# Patient Record
Sex: FEMALE | Race: ASIAN | URBAN - METROPOLITAN AREA
[De-identification: names, ages, dates, MRNs, and addresses within clinical notes are randomized per-mention and may not be internally consistent; named-entity substitution may affect disease eponyms.]

---

## 2017-01-06 ENCOUNTER — TRANSFERRED RECORDS (OUTPATIENT)
Dept: HEALTH INFORMATION MANAGEMENT | Facility: CLINIC | Age: 72
End: 2017-01-06

## 2017-01-26 ENCOUNTER — TRANSFERRED RECORDS (OUTPATIENT)
Dept: HEALTH INFORMATION MANAGEMENT | Facility: CLINIC | Age: 72
End: 2017-01-26

## 2017-04-05 DIAGNOSIS — R42 VERTIGO: Primary | ICD-10-CM

## 2017-04-06 ENCOUNTER — OFFICE VISIT (OUTPATIENT)
Dept: AUDIOLOGY | Facility: CLINIC | Age: 72
End: 2017-04-06

## 2017-04-06 ENCOUNTER — OFFICE VISIT (OUTPATIENT)
Dept: OTOLARYNGOLOGY | Facility: CLINIC | Age: 72
End: 2017-04-06

## 2017-04-06 DIAGNOSIS — H90.3 SNHL (SENSORY-NEURAL HEARING LOSS), ASYMMETRICAL: ICD-10-CM

## 2017-04-06 DIAGNOSIS — R42 DIZZINESS: Primary | ICD-10-CM

## 2017-04-06 DIAGNOSIS — H92.01 PAIN IN RIGHT EAR: ICD-10-CM

## 2017-04-06 DIAGNOSIS — H93.11 TINNITUS, RIGHT: ICD-10-CM

## 2017-04-06 DIAGNOSIS — H93.8X1 SENSATION OF FULLNESS IN RIGHT EAR: ICD-10-CM

## 2017-04-06 DIAGNOSIS — H90.3 SENSORINEURAL HEARING LOSS, ASYMMETRICAL: ICD-10-CM

## 2017-04-06 RX ORDER — MULTIPLE VITAMINS W/ MINERALS TAB 9MG-400MCG
1 TAB ORAL DAILY
COMMUNITY

## 2017-04-06 ASSESSMENT — PAIN SCALES - GENERAL: PAINLEVEL: EXTREME PAIN (8)

## 2017-04-06 NOTE — PATIENT INSTRUCTIONS
The patient presents with a history of dizziness and asymmetric sensorineural hearing loss.  The patient and I have discussed proceeding with further evaluation of the symptoms.  The patient will be referred for vestibular evaluation. She will obtain a CD copy of her MRI scan of the head and she will bring this to her next visit for our review. She will be seen again after her vestibular testing is completed.

## 2017-04-06 NOTE — LETTER
4/6/2017       RE: Karolina Winn  [address] [facility] [address] [place] [address]     Dear Colleague,    Thank you for referring your patient, Karolina Winn, to the [facility] EAR NOSE AND THROAT at [facility]. Please see a copy of my visit note below.    The patient presents with a history of dizziness.  The patient reports that the symptoms have recently become much more severe, although the patient has had occasional difficulties with dizziness episodes in the past. She reports that this difficulty began a few years ago. She notices worsening hearing in the right ear more than the left ear.  The patient has had associated nausea with the events. The patient has had no weakness in the extremities or facial weakness or slurring of speech. The patient denies sinusitis, rhinitis, facial pain, nasal obstruction or purulent nasal discharge. The patient denies chronic or recurrent tonsillitis, chronic or recurrent pharyngitis. The patient's Audiogram and Tympanogram demonstrates bilateral asymmetric sensorineural hearing loss worse in the right ear than in the left ear. The patient has 60% word recognition in the right ear and 100% in the left ear. The patient's tympanograms are normal. She reports that she had an MRI scan of the head and that this was normal. However, we do not have the scan or the report.     This patient is seen in consultation at the request of Dr. Shaw in [place], [place].    All other systems were reviewed and they are either negative or they are not directly pertinent to this Otolaryngology examination.      Past Medical History:    No past medical history on file.    Past Surgical History:    No past surgical history on file.    Medications:      Current Outpatient Prescriptions:      TRIAMTERENE PO, Take 37.5 mg by mouth, Disp: , Rfl:      multivitamin, therapeutic with minerals (MULTI-VITAMIN) TABS tablet, Take 1 tablet by mouth daily, Disp: , Rfl:      Allergies:    Penicillins    Physical Examination:    The patient is a well developed, well nourished female in no apparent distress.  She is normocephalic, atraumatic with pupils equally round and reactive to light.    Oral Cavity Examination:  Normal mucosa with no masses or lesions  Nasal Examination: Normal mucosa with no masses or lesions  Ear Examination: Ear canals clear, tympanic membranes and middle ear spaces normal  Neurological Examination: Facial nerve function intact and symmetric.  The patient has no gaze induced or spontaneous nystagmus.   Integumentary Examination: No lesions on the skin of the head and neck  Neck Examination: No masses or lesions, no lymphadenopathy  Endocrine Examination: Normal thyroid examination    Assessment and Plan:    The patient presents with a history of dizziness and asymmetric sensorineural hearing loss.  The patient and I have discussed proceeding with further evaluation of the symptoms.  The patient will be referred for vestibular evaluation. She will obtain a CD copy of her MRI scan of the head and she will bring this to her next visit for our review. She will be seen again after her vestibular testing is completed.     CC: Dr. Shaw in [place], [place]    Again, thank you for allowing me to participate in the care of your patient.      Sincerely,    Christoph De MD

## 2017-04-06 NOTE — MR AVS SNAPSHOT
After Visit Summary   4/6/2017    Karolina Winn    MRN: [id number]           Patient Information     Date Of Birth          1945        Visit Information        Provider Department      4/6/2017 2:30 PM Christoph De MD [facility] Ear Nose and Throat        Today's Diagnoses     Dizziness    -  1    SNHL (sensory-neural hearing loss), asymmetrical          Care Instructions    The patient presents with a history of dizziness and asymmetric sensorineural hearing loss.  The patient and I have discussed proceeding with further evaluation of the symptoms.  The patient will be referred for vestibular evaluation. She will obtain a CD copy of her MRI scan of the head and she will bring this to her next visit for our review. She will be seen again after her vestibular testing is completed.         Follow-ups after your visit        Additional Services     AUDIOLOGY BALANCE REFERRAL       Please perform: VNG, Caloric testing and ECOG                  Your next 10 appointments already scheduled     May 31, 2017  1:00 PM CDT   Electrocochleography with Dinora Briceno,  AUD ABR MACHINE [address] [place] [address] Audiology ([facility] [place])    [address] [place] [id number] [phone]           ECO: Electrocochleography  How is the test done?  The test records electrical signals made by the inner ear and auditory nerve.   First, we clean the skin before placing sticky patches (electrodes) in several places on the head and shoulders.   One more electrode is placed in the ear canal. This is a small piece of cotton, soaked in water, connected by a tiny wire.   The examiner looks through a microscope to place the electrode on the eardrum. Most patients feel a tickle or slight pressure when this is done. A few patients feel mild discomfort.   After the cotton is in place, most patients do not feel it at all. A foam plug placed in the ear canal holds the electrode  in place.   The plug also serves as an earphone for sounds presented as part of the test. The patient hears very rapid clicks that are loud but usually not uncomfortable.   The patient lies on a bed and should try to relax. Many people sleep through the test.  ABR (auditory brainstem response) test The ABR test is usually done at the same time as ECOG. This test records electrical signals made by the brain when it hears sounds. The electrodes can also record brain signals so that both tests can be done at the same time. The two tests together can help find out where your symptoms come from. The two tests usually take about 2 hours. It takes another hour to read the results and write a report. The report is sent to the referring doctor who will choose a treatment.             Jun 01, 2017  1:00 PM CDT   (Arrive by 12:45 PM)   Balance Testing with Kae Pollock [facility] Audiology ([facility])    [address] [place] [address] [phone]           You are scheduled for the following tests: VNG (Videonystagmography). You will wear goggles with small cameras. These record small eye movements as you change position. This test takes 1 to 1 1/2 hours. Rotational Chair. You will sit a chair that has a motor. The room will be dark. You will wear goggles with a camera while the chair rocks slowly from side to side. This test takes 20 to 30 minutes. CDP (Computerized Dynamic Posturography). You will stand on a platform with your eyes open or closed. It will be unsteady at times. This will tell us how your balance systems work together and how well you sense the ground under your feet. This test takes 30 minutes.  Why am I having these tests? These are tests for your inner ear. They may help us find out why you feel dizzy or off balance.  How do I prepare? Below is a list of things that can affect test results. Do NOT take these for 48 hours before your tests or we  will need to reschedule. We want to make sure your test results are correct. Ask your doctor if you have questions about stopping any medicine.  48 hours before the tests: Stop drinking alcohol (beer, wine, liquor). Do NOT take Amitriptyline. Do NOT take medicines for: Allergy or colds. Examples: Benadryl (diphenhydramine), Allegra (fexofenadine), Zyrtec (cetirizine) and any over-the-counter medicine that may cause drowsiness. Anti-anxiety, unless you have been on them every day for the past 8 weeks or more. Examples: Xanax (alprazolam), Klonopin (clonazepam), Valium (diazepam), Ativan (lorazepam). Dizziness and nausea. Examples: Antivert/Bonine/Dramamine Less-Drowsy Formula (meclizine), Dramamine (dimenhydrinate), Trans-derm Scop (Scopolamine), Compazine (prochlorperazine), Phenergan (promethazine). Sleeping. This includes sleeping pills, sedatives, tranquilizers, muscle relaxants and narcotics. It is okay to take medicines for diabetes, heart, blood pressure, seizures, thyroid or an ongoing condition.  The day of the tests:   Please have a  with you.   Do not have caffeine (coffee, soda, chocolate, energy drinks).   Do not smoke or have nicotine for at least 4 hours before the tests. This includes tobacco, e-cigarettes and nicotine gum.   Do not eat 2 to 3 hours before the tests, unless you have diabetes. Then, you should follow your usual diet.   Do not wear any make-up or lotions around your eyes or eyelids.   If you wear contact lenses, be prepared to take them out for testing; or wear your glasses.   If you take the anti-nausea medicine Zofran (ondansetron), you may bring it with you to take after your tests.  Who should I call if I have questions? If you have any questions, please call the [facility] at [facility]: [phone]            Jun 01, 2017  2:30 PM CDT   (Arrive by 2:15 PM)   Return Visit with Christoph De MD   [facility] Ear Nose and Throat ([facility])    [address] [facility]  [address] [facility] [address] [phone]              Who to contact     Please call your clinic at [phone] to:    Ask questions about your health    Make or cancel appointments    Discuss your medicines    Learn about your test results    Speak to your doctor   If you have compliments or concerns about an experience at your clinic, or if you wish to file a complaint, please contact [facility] Patient Relations at [phone] or email us at [email or web address]         Additional Information About Your Visit        [employer]is Information     CRE Securet is an electronic gateway that provides easy, online access to your medical records. With Sevcon, you can request a clinic appointment, read your test results, renew a prescription or communicate with your care team.     To sign up for Sevcon visit the website at [email or web address]   You will be asked to enter the access code listed below, as well as some personal information. Please follow the directions to create your username and password.     Your access code is: [email or web address]  Expires: 2017  2:54 PM     Your access code will  in 90 days. If you need help or a new code, please contact your [facility] Clinic or call [phone] for assistance.        Care EveryWhere ID     This is your Care EveryWhere ID. This could be used by other organizations to access your [place] medical records  [id number]         Blood Pressure from Last 3 Encounters:   No data found for BP    Weight from Last 3 Encounters:   No data found for Wt              We Performed the Following     AUDIOLOGY BALANCE REFERRAL        Primary Care Provider    No Ref Primary Verified Flk Only       No address on file        Thank you!     Thank you for choosing [facility] EAR NOSE AND THROAT  for your care. Our goal is always to provide you with excellent  care. Hearing back from our patients is one way we can continue to improve our services. Please take a few minutes to complete the written survey that you may receive in the mail after your visit with us. Thank you!             Your Updated Medication List - Protect others around you: Learn how to safely use, store and throw away your medicines at www.disposemymeds.org.          This list is accurate as of: 4/6/17  3:41 PM.  Always use your most recent med list.                   Brand Name Dispense Instructions for use    Multi-vitamin Tabs tablet      Take 1 tablet by mouth daily       TRIAMTERENE PO      Take 37.5 mg by mouth

## 2017-04-06 NOTE — PROGRESS NOTES
The patient presents with a history of dizziness.  The patient reports that the symptoms have recently become much more severe, although the patient has had occasional difficulties with dizziness episodes in the past. She reports that this difficulty began a few years ago. She notices worsening hearing in the right ear more than the left ear.  The patient has had associated nausea with the events. The patient has had no weakness in the extremities or facial weakness or slurring of speech. The patient denies sinusitis, rhinitis, facial pain, nasal obstruction or purulent nasal discharge. The patient denies chronic or recurrent tonsillitis, chronic or recurrent pharyngitis. The patient's Audiogram and Tympanogram demonstrates bilateral asymmetric sensorineural hearing loss worse in the right ear than in the left ear. The patient has 60% word recognition in the right ear and 100% in the left ear. The patient's tympanograms are normal. She reports that she had an MRI scan of the head and that this was normal. However, we do not have the scan or the report.     This patient is seen in consultation at the request of Dr. Shaw in [place], [place].    All other systems were reviewed and they are either negative or they are not directly pertinent to this Otolaryngology examination.      Past Medical History:    No past medical history on file.    Past Surgical History:    No past surgical history on file.    Medications:      Current Outpatient Prescriptions:      TRIAMTERENE PO, Take 37.5 mg by mouth, Disp: , Rfl:      multivitamin, therapeutic with minerals (MULTI-VITAMIN) TABS tablet, Take 1 tablet by mouth daily, Disp: , Rfl:     Allergies:    Penicillins    Physical Examination:    The patient is a well developed, well nourished female in no apparent distress.  She is normocephalic, atraumatic with pupils equally round and reactive to light.    Oral Cavity Examination:  Normal mucosa with no masses or lesions  Nasal  Examination: Normal mucosa with no masses or lesions  Ear Examination: Ear canals clear, tympanic membranes and middle ear spaces normal  Neurological Examination: Facial nerve function intact and symmetric.  The patient has no gaze induced or spontaneous nystagmus.   Integumentary Examination: No lesions on the skin of the head and neck  Neck Examination: No masses or lesions, no lymphadenopathy  Endocrine Examination: Normal thyroid examination    Assessment and Plan:    The patient presents with a history of dizziness and asymmetric sensorineural hearing loss.  The patient and I have discussed proceeding with further evaluation of the symptoms.  The patient will be referred for vestibular evaluation. She will obtain a CD copy of her MRI scan of the head and she will bring this to her next visit for our review. She will be seen again after her vestibular testing is completed.     CC: Dr. Shaw in [place], [place]

## 2017-04-06 NOTE — MR AVS SNAPSHOT
After Visit Summary   2017    Karolina Winn    MRN: [id number]           Patient Information     Date Of Birth          1945        Visit Information        Provider Department      2017 1:30 PM Molly Low AuD [facility] Audiology        Today's Diagnoses     Dizziness    -  1    Sensorineural hearing loss, asymmetrical        Tinnitus, right        Pain in right ear        Sensation of fullness in right ear           Follow-ups after your visit        Who to contact     Please call your clinic at [phone] to:    Ask questions about your health    Make or cancel appointments    Discuss your medicines    Learn about your test results    Speak to your doctor   If you have compliments or concerns about an experience at your clinic, or if you wish to file a complaint, please contact [facility] Patient Relations at [phone] or email us at [email or web address]         Additional Information About Your Visit        MyChart Information     Ingrian Networkst is an electronic gateway that provides easy, online access to your medical records. With MightyQuiz, you can request a clinic appointment, read your test results, renew a prescription or communicate with your care team.     To sign up for [employer]rian Networkst visit the website at [email or web address]   You will be asked to enter the access code listed below, as well as some personal information. Please follow the directions to create your username and password.     Your access code is: [email or web address]  Expires: 2017  2:54 PM     Your access code will  in 90 days. If you need help or a new code, please contact your [facility] or call [phone] for assistance.        Care EveryWhere ID     This is your Care EveryWhere ID. This could be used by other organizations to access your [place] medical records  [id number]         Blood Pressure from Last 3 Encounters:   No data  found for BP    Weight from Last 3 Encounters:   No data found for Wt              We Performed the Following     AUDIOGRAM/TYMPANOGRAM - INTERFACE     [facility] Rec[facility] ([address])     Tymps / Reflex   ([id number])        Primary Care Provider    No Ref Primary Verified Flk Only       No address on file        Thank you!     Thank you for choosing [facility] AUDIOLOGY  for your care. Our goal is always to provide you with excellent care. Hearing back from our patients is one way we can continue to improve our services. Please take a few minutes to complete the written survey that you may receive in the mail after your visit with us. Thank you!             Your Updated Medication List - Protect others around you: Learn how to safely use, store and throw away your medicines at www.disposemymeds.org.          This list is accurate as of: 4/6/17  2:47 PM.  Always use your most recent med list.                   Brand Name Dispense Instructions for use    Multi-vitamin Tabs tablet      Take 1 tablet by mouth daily       TRIAMTERENE PO      Take 37.5 mg by mouth

## 2017-04-06 NOTE — PROGRESS NOTES
AUDIOLOGY REPORT    SUMMARY: Audiology visit completed. See audiogram for results.      RECOMMENDATIONS: Follow-up with ENT.    Dinora Lopez  Audiologist  [place] License  #[id number]

## 2017-05-31 ENCOUNTER — OFFICE VISIT (OUTPATIENT)
Dept: AUDIOLOGY | Facility: CLINIC | Age: 72
End: 2017-05-31

## 2017-05-31 DIAGNOSIS — H90.5 SENSORINEURAL HEARING LOSS: Primary | ICD-10-CM

## 2017-05-31 NOTE — MR AVS SNAPSHOT
After Visit Summary   5/31/2017    Karolina Winn    MRN: [id number]           Patient Information     Date Of Birth          1945        Visit Information        Provider Department      5/31/2017 2:30 PM Liza Tolbert AuD; JAS ALVARADO ABR MACHINE 1 [facility]         Follow-ups after your visit        Your next 10 appointments already scheduled     May 31, 2017  2:30 PM CDT   Electrocochleography with Dinora Briceno, JAS ALVARADO ABR MACHINE 1   [facility] ([facility])    [address] [place] [id number] [phone]           ECO: Electrocochleography  How is the test done?  The test records electrical signals made by the inner ear and auditory nerve.   First, we clean the skin before placing sticky patches (electrodes) in several places on the head and shoulders.   One more electrode is placed in the ear canal. This is a small piece of cotton, soaked in water, connected by a tiny wire.   The examiner looks through a microscope to place the electrode on the eardrum. Most patients feel a tickle or slight pressure when this is done. A few patients feel mild discomfort.   After the cotton is in place, most patients do not feel it at all. A foam plug placed in the ear canal holds the electrode in place.   The plug also serves as an earphone for sounds presented as part of the test. The patient hears very rapid clicks that are loud but usually not uncomfortable.   The patient lies on a bed and should try to relax. Many people sleep through the test.  ABR (auditory brainstem response) test The ABR test is usually done at the same time as ECOG. This test records electrical signals made by the brain when it hears sounds. The electrodes can also record brain signals so that both tests can be done at the same time. The two tests together can help find out where your symptoms come from. The two tests usually take about 2 hours. It takes  another hour to read the results and write a report. The report is sent to the referring doctor who will choose a treatment.             Jun 01, 2017  1:00 PM CDT   (Arrive by 12:45 PM)   Balance Testing with Kae Pollock [facility] ([facility] [place])    [address] [address] [phone]           You are scheduled for the following tests: VNG (Videonystagmography). You will wear goggles with small cameras. These record small eye movements as you change position. This test takes 1 to 1 1/2 hours. Rotational Chair. You will sit a chair that has a motor. The room will be dark. You will wear goggles with a camera while the chair rocks slowly from side to side. This test takes 20 to 30 minutes. CDP (Computerized Dynamic Posturography). You will stand on a platform with your eyes open or closed. It will be unsteady at times. This will tell us how your balance systems work together and how well you sense the ground under your feet. This test takes 30 minutes.  Why am I having these tests? These are tests for your inner ear. They may help us find out why you feel dizzy or off balance.  How do I prepare? Below is a list of things that can affect test results. Do NOT take these for 48 hours before your tests or we will need to reschedule. We want to make sure your test results are correct. Ask your doctor if you have questions about stopping any medicine.  48 hours before the tests: Stop drinking alcohol (beer, wine, liquor). Do NOT take Amitriptyline. Do NOT take medicines for: Allergy or colds. Examples: Benadryl (diphenhydramine), Allegra (fexofenadine), Zyrtec (cetirizine) and any over-the-counter medicine that may cause drowsiness. Anti-anxiety, unless you have been on them every day for the past 8 weeks or more. Examples: Xanax (alprazolam), Klonopin (clonazepam), Valium (diazepam), Ativan (lorazepam). Dizziness and nausea. Examples:  Antivert/Bonine/Dramamine Less-Drowsy Formula (meclizine), Dramamine (dimenhydrinate), Trans-derm Scop (Scopolamine), Compazine (prochlorperazine), Phenergan (promethazine). Sleeping. This includes sleeping pills, sedatives, tranquilizers, muscle relaxants and narcotics. It is okay to take medicines for diabetes, heart, blood pressure, seizures, thyroid or an ongoing condition.  The day of the tests:   Please have a  with you.   Do not have caffeine (coffee, soda, chocolate, energy drinks).   Do not smoke or have nicotine for at least 4 hours before the tests. This includes tobacco, e-cigarettes and nicotine gum.   Do not eat 2 to 3 hours before the tests, unless you have diabetes. Then, you should follow your usual diet.   Do not wear any make-up or lotions around your eyes or eyelids.   If you wear contact lenses, be prepared to take them out for testing; or wear your glasses.   If you take the anti-nausea medicine Zofran (ondansetron), you may bring it with you to take after your tests.  Who should I call if I have questions? If you have any questions, please call the [facility] at [facility]: [phone]            Jun 01, 2017  2:30 PM CDT   (Arrive by 2:15 PM)   Return Visit with Christoph De MD   [facility] ([facility])    [address] [place] [id number] [phone]              Who to contact     Please call your clinic at [phone] to:    Ask questions about your health    Make or cancel appointments    Discuss your medicines    Learn about your test results    Speak to your doctor   If you have compliments or concerns about an experience at your clinic, or if you wish to file a complaint, please contact [facility] Patient Relations at [phone] or email us at [email or web address]         Additional Information About Your Visit        Kamlesh  Information     [employer] is an electronic gateway that provides easy, online access to your medical records. With [employer], you can request a clinic appointment, read your test results, renew a prescription or communicate with your care team.     To sign up for [employer] visit the website at [email or web address]   You will be asked to enter the access code listed below, as well as some personal information. Please follow the directions to create your username and password.     Your access code is: [email or web address]  Expires: 8/15/2017  6:30 AM     Your access code will  in 90 days. If you need help or a new code, please contact your [facility] or call [phone] for assistance.        Care EveryWhere ID     This is your Care EveryWhere ID. This could be used by other organizations to access your [place] medical records  [id number]         Blood Pressure from Last 3 Encounters:   No data found for BP    Weight from Last 3 Encounters:   No data found for Wt              Today, you had the following     No orders found for display       Primary Care Provider    No Ref Primary Verified Flk Only       No address on file        Thank you!     Thank you for choosing [facility]  for your care. Our goal is always to provide you with excellent care. Hearing back from our patients is one way we can continue to improve our services. Please take a few minutes to complete the written survey that you may receive in the mail after your visit with us. Thank you!             Your Updated Medication List - Protect others around you: Learn how to safely use, store and throw away your medicines at www.disposemymeds.org.          This list is accurate as of: 17  2:28 PM.  Always use your most recent med list.                   Brand Name Dispense Instructions for use    Multi-vitamin Tabs tablet      Take 1 tablet by mouth daily       TRIAMTERENE PO      Take 37.5 mg by mouth

## 2017-05-31 NOTE — PROGRESS NOTES
AUDIOLOGY REPORT    BACKGROUND INFORMATION: Karolina Winn was seen in Audiology at the [facility] on 5/31/2017 for an electrocochleography (ECochG) evaluation, referred by Christoph De M.D. The patient reports a 5-6 year history of episodic vertigo, right sided tinnitus, and decreased right hearing.  The patient reports that she used to get vertigo episodes once every 2-3 months; however they have recently increased to weekly episodes. Her episodes generally last several hours and are consistently associated with nausea and vomiting. Karolina reports that closing her eyes and laying down can help improve her vertigo. Bending her head back and not getting enough sleep can bring on her symptoms. Karolina reports that her cousin also has as history of dizziness, and underwent surgery by a ENT here at the [facility] many years ago. Her cousin has not had a dizzy episode since her surgery. Karolina is interested in learning about surgical options to aid in her dizziness.    Most recent hearing evaluation performed revealed left normal to moderate sensorineural hearing loss, and right moderately-severe rising to mild sloping to severe sensorineural hearing loss.      TEST RESULTS AND PROCEDURES: Using a microscope tympanic membranes were visualized.  Tympanograms showed normal eardrum mobility bilaterally.   A two-channel ECochG recording was performed for clicks, 1000 Hz, and 2000 Hz tonebursts bilaterally.  Clicks for the right ear showed normal SP/AP ratios.  Tonebursts in the right ear for 1000 Hz and 2000 Hz showed normal summating potentials (-.07 uV for 1000 Hz and -.5 uV for 2000 Hz).  Clicks for the left ear showed normal SP/AP ratios.  Tonebursts in the left ear for 1000 Hz and 2000 Hz showed normal summating potentials (-.42 uV for 1000 Hz and -.42 uV for 2000 Hz).  Abrnormal SP/AP ratios must be greater than .43 for clicks.  Abnormal summating potentials must be more  negative than 1.75 uV at 1000 Hz and 2.25 uV at 2000 Hz.      Click SP/AP ratio 1000 Hz toneburst summating potential 2000 Hz toneburst summating potential   Right ear  .801  -.07 uV  -.5 uV   Left ear  .532  -.42 uV  -.42 uV       SUMMARY AND RECOMMENDATIONS: Today s ECochG revealed abnormal SP/AP ratios and normal summating potentials, bilaterally. Abnormally high SP/AP ratios may be indicative of endolymphatic hydrops or superior canal dehiscence.  Call this clinic with questions regarding today s results.  Follow-up with Christoph De M.D.  for medical management.      Marshall Parks.  Licensed Audiologist  [place] #[id number]

## 2017-06-01 ENCOUNTER — OFFICE VISIT (OUTPATIENT)
Dept: OTOLARYNGOLOGY | Facility: CLINIC | Age: 72
End: 2017-06-01

## 2017-06-01 ENCOUNTER — OFFICE VISIT (OUTPATIENT)
Dept: AUDIOLOGY | Facility: CLINIC | Age: 72
End: 2017-06-01

## 2017-06-01 VITALS — HEIGHT: 58 IN | BODY MASS INDEX: 28.76 KG/M2 | WEIGHT: 137 LBS

## 2017-06-01 DIAGNOSIS — R42 DIZZINESS: Primary | ICD-10-CM

## 2017-06-01 DIAGNOSIS — R42 DIZZINESS AND GIDDINESS: Primary | ICD-10-CM

## 2017-06-01 DIAGNOSIS — H90.3 SNHL (SENSORY-NEURAL HEARING LOSS), ASYMMETRICAL: ICD-10-CM

## 2017-06-01 DIAGNOSIS — H81.01 MENIERE DISEASE, RIGHT: ICD-10-CM

## 2017-06-01 RX ORDER — TRIAMTERENE AND HYDROCHLOROTHIAZIDE 37.5; 25 MG/1; MG/1
CAPSULE ORAL
Qty: 90 CAPSULE | Refills: 1 | Status: SHIPPED | OUTPATIENT
Start: 2017-06-01 | End: 2017-06-01

## 2017-06-01 RX ORDER — TRIAMTERENE AND HYDROCHLOROTHIAZIDE 37.5; 25 MG/1; MG/1
CAPSULE ORAL
Qty: 90 CAPSULE | Refills: 1 | Status: SHIPPED | OUTPATIENT
Start: 2017-06-01

## 2017-06-01 ASSESSMENT — PAIN SCALES - GENERAL: PAINLEVEL: MODERATE PAIN (5)

## 2017-06-01 NOTE — PROGRESS NOTES
The patient presents with a history of dizziness.  The patient reports that the symptoms have recently become much more severe, although the patient has had occasional difficulties with dizziness episodes in the past. She reports that this difficulty began a few years ago. She notices worsening hearing in the right ear more than the left ear.  The patient has had associated nausea with the events. The patient has had no weakness in the extremities or facial weakness or slurring of speech. The patient's Audiogram and Tympanogram demonstrates bilateral asymmetric sensorineural hearing loss worse in the right ear than in the left ear. The patient has 60% word recognition in the right ear and 100% in the left ear. The patient's tympanograms are normal. She reports that she had an MRI scan of the head and that this was normal and this is reviewed today. The patient's vestibular testing is reviewed with her and this demonstrates evidence of meniere's disease with the right ear the worst ear on testing today. This testing is reviewed with the Audiologist who have just completed this testing.       All other systems were reviewed and they are either negative or they are not directly pertinent to this Otolaryngology examination.      Past Medical History:    Past Medical History:   Diagnosis Date     Arthritis      Benign positional vertigo      Conductive hearing loss      Hearing problem      Meniere's disease      Reduced vision      Sensorineural hearing loss      Uncomplicated asthma        Past Surgical History:    No past surgical history on file.    Medications:      Current Outpatient Prescriptions:      TRIAMTERENE PO, Take 37.5 mg by mouth, Disp: , Rfl:      multivitamin, therapeutic with minerals (MULTI-VITAMIN) TABS tablet, Take 1 tablet by mouth daily, Disp: , Rfl:     Allergies:    Penicillins    Physical Examination:    The patient is a well developed, well nourished female in no apparent distress.  She is  normocephalic, atraumatic with pupils equally round and reactive to light.    Oral Cavity Examination:  Normal mucosa with no masses or lesions  Nasal Examination: Normal mucosa with no masses or lesions  Ear Examination: Ear canals clear, tympanic membranes and middle ear spaces normal  Neurological Examination: Facial nerve function intact and symmetric.  The patient has no gaze induced or spontaneous nystagmus.   Integumentary Examination: No lesions on the skin of the head and neck    Assessment and Plan:    The patient presents with a history of dizziness and asymmetric sensorineural hearing loss.  She appears to have meniere's disease by vestibular and hearing and testing and she has no evidence of retrocochlear pathology or intracranial pathology. She will be initiated on dyazide and she will follow up with neurotologist Dr. Rick Nissen for further management.     CC: Dr. Shaw in [place], [place]

## 2017-06-01 NOTE — LETTER
6/1/2017       RE: Karolina Winn  [address] [facility] [place] [address]     Dear Colleague,    Thank you for referring your patient, Karolina Winn, to the [facility] at [facility]. Please see a copy of my visit note below.    The patient presents with a history of dizziness.  The patient reports that the symptoms have recently become much more severe, although the patient has had occasional difficulties with dizziness episodes in the past. She reports that this difficulty began a few years ago. She notices worsening hearing in the right ear more than the left ear.  The patient has had associated nausea with the events. The patient has had no weakness in the extremities or facial weakness or slurring of speech. The patient's Audiogram and Tympanogram demonstrates bilateral asymmetric sensorineural hearing loss worse in the right ear than in the left ear. The patient has 60% word recognition in the right ear and 100% in the left ear. The patient's tympanograms are normal. She reports that she had an MRI scan of the head and that this was normal and this is reviewed today. The patient's vestibular testing is reviewed with her and this demonstrates evidence of meniere's disease with the right ear the worst ear on testing today. This testing is reviewed with the Audiologist who have just completed this testing.       All other systems were reviewed and they are either negative or they are not directly pertinent to this Otolaryngology examination.      Past Medical History:    Past Medical History:   Diagnosis Date     Arthritis      Benign positional vertigo      Conductive hearing loss      Hearing problem      Meniere's disease      Reduced vision      Sensorineural hearing loss      Uncomplicated asthma        Past Surgical History:    No past surgical history on file.    Medications:      Current Outpatient Prescriptions:      TRIAMTERENE PO, Take 37.5 mg by mouth, Disp:  , Rfl:      multivitamin, therapeutic with minerals (MULTI-VITAMIN) TABS tablet, Take 1 tablet by mouth daily, Disp: , Rfl:     Allergies:    Penicillins    Physical Examination:    The patient is a well developed, well nourished female in no apparent distress.  She is normocephalic, atraumatic with pupils equally round and reactive to light.    Oral Cavity Examination:  Normal mucosa with no masses or lesions  Nasal Examination: Normal mucosa with no masses or lesions  Ear Examination: Ear canals clear, tympanic membranes and middle ear spaces normal  Neurological Examination: Facial nerve function intact and symmetric.  The patient has no gaze induced or spontaneous nystagmus.   Integumentary Examination: No lesions on the skin of the head and neck    Assessment and Plan:    The patient presents with a history of dizziness and asymmetric sensorineural hearing loss.  She appears to have meniere's disease by vestibular and hearing and testing and she has no evidence of retrocochlear pathology or intracranial pathology. She will be initiated on dyazide and she will follow up with neurotologist Dr. Rick Nissen for further management.     CC: Dr. Shaw in [place], [place]    Again, thank you for allowing me to participate in the care of your patient.      Sincerely,    Christoph De MD

## 2017-06-01 NOTE — PATIENT INSTRUCTIONS
The patient presents with a history of dizziness and asymmetric sensorineural hearing loss.  She appears to have meniere's disease by vestibular and hearing and testing and she has no evidence of retrocochlear pathology or intracranial pathology. She will be initiated on dyazide and she will follow up with neurotologist Dr. Rick Nissen for further management.

## 2017-06-01 NOTE — PROGRESS NOTES
"AUDIOLOGY REPORT    BACKGROUND INFORMATION: Karolina Winn, 72 year old, was seen in Audiology at the [facility] on 6/1/2017, for videonystagmography (VNG), referred by Christoph De MD. The patient reports her first episode of dizziness was about 5-6 years ago. She reports room spinning accompanied by nausea and vomiting. These episodes have continued periodically. Sometimes she will not experience one for a few months, or can experience one up to 1 a week. She will lay down and sleep and it takes her a day to recover. In between episodes she feels well. She has ringing in her right ear. Karolina has had 2 eye surgeries for a \"growth\" in her eye. She denies head injury's, migraines, ear surgeries, and any other major medical conditions. Hearing evaluations have revealed normal sloping to moderate. The patient has not taken any antivestibular medications in the past 24 hours.    TEST RESULTS AND PROCEDURES:    Dizziness Handicap Inventory (DHI): 28/100 mild perceived impairment    Videonystagmography (VNG) testing:  Tympanograms: normal eardrum mobility bilaterally  Ocular range of motion and ocular counter roll: Normal  Head Thrust: Negative  Gaze nystagmus with fixation: WNL   Saccades: Abnormal by velocities and accuracies  Anti-saccades: Abnormal: Pt had trouble completing task  Pursuit: Abnormal: Normal gain abnormal morphology  Optokinetics: WNL  Gaze with fixation removed: WNL  Head Shake test: 3 deg/sec left-beating    Isabel-Hallpike Head Right: Negative for nystagmus & symptoms   Isabel-Hallpike Head Left: Positive for mild right beating nystagmus that has a few torsional beats & symptoms   Roll Test: Negative for nystagmus & symptoms bilaterally    Positionals: Supine: WNL: 1 deg/sec right beating (not significant)  Positionals: Body Right: WNL  Positionals: Body Left: WNL  Positionals: Pre-Caloric:WNL: 2 deg/sec right beating (not significant)    Calorics (Tested at " 44 degrees and 30 degrees Celsius for 30 seconds for warm and cool water, respectively):  Right Warm Eye Speed: 12 degrees per second right beating  Left Warm Eye Speed: 19 degrees per second left beating  Right Cool Eye Speed: 7 degrees per second left beating  Left Cool Eye Speed: 15 degrees per second right beating  Difference between ear: 31% right hypofunction. (Greater than 25% considered clinically significant.) It is noted that spontaneous nystagmus is not calculated into responses as it did not change the results.  Fixation Index: 0.09  Overall caloric test: abnormal    SUMMARY AND RECOMMENDATIONS:   1) Indications of central vestibular system involvement noted on today's exam were as follows:   -Abnormal saccades  -Abnormal Pursuit(normal by gain, abnormal by morphology)  -Difficulty completing Anti-saccades    2) Indications of peripheral vestibular system involvement noted on today's exam were as follows:   -Positive Headshake test with 3 deg/sec left-beating nystagmus  -Abnormal calorics 31% right hypofunction found today  -Somerset-Hallpike left: Mild right beating with a few geotropic torsional nystagmus beats with symptom. Unable to determine if a mild BPPV is present.     Recommendations: Consider Physical Therapy for compensation strategies for a unilateral hypofunction. They can also reassess left Isabel-Hallpike to see if a mild Benign Paroxysmal Positional Vertigo (BPPV) is present.     Follow-up with Christoph De MD for medical management.  Please call this clinic at [phone] with questions regarding these results or recommendations.         Yamileth Aviles, [facility]  Licensed Audiologist  [place] #[id number]

## 2017-06-01 NOTE — MR AVS SNAPSHOT
After Visit Summary   2017    Karolina Winn    MRN: [id number]           Patient Information     Date Of Birth          1945        Visit Information        Provider Department      2017 1:00 PM Kae Pollock AuD [facility] Audiology        Today's Diagnoses     Dizziness and giddiness    -  1       Follow-ups after your visit        Your next 10 appointments already scheduled     2017  4:00 PM CDT   (Arrive by 3:45 PM)   NEW NEUROTOLOGY VISIT with Rick L Nissen, MD [facility] Ear Nose and Throat ([facility] [place])    [address] [place] [id number] [phone]              Who to contact     Please call your clinic at [phone] to:    Ask questions about your health    Make or cancel appointments    Discuss your medicines    Learn about your test results    Speak to your doctor   If you have compliments or concerns about an experience at your clinic, or if you wish to file a complaint, please contact [facility] Patient Relations at [phone] or email us at [email or web address]         Additional Information About Your Visit        MyChart Information     Imalogixt is an electronic gateway that provides easy, online access to your medical records. With [employer], you can request a clinic appointment, read your test results, renew a prescription or communicate with your care team.     To sign up for Imalogixt visit the website at [email or web address]   You will be asked to enter the access code listed below, as well as some personal information. Please follow the directions to create your username and password.     Your access code is: [email or web address]  Expires: 8/15/2017  6:30 AM     Your access code will  in 90 days. If you need help or a new code, please contact your [facility] Clinic or call [phone] for assistance.        Care EveryWhere ID     This is your  Care EveryWhere ID. This could be used by other organizations to access your [place] medical records  [id number]         Blood Pressure from Last 3 Encounters:   No data found for BP    Weight from Last 3 Encounters:   06/01/17 62.1 kg (137 lb)              We Performed the Following     Basic Vestibular Evaluation (63686)     Caloric Vestibular Test, W./ Rec, Bilateral, [place], 4 Irrigations (17484)     Tympanometry (impedance - testing) (56948)          Today's Medication Changes          These changes are accurate as of: 6/1/17 11:59 PM.  If you have any questions, ask your nurse or doctor.               Start taking these medicines.        Dose/Directions    triamterene-hydrochlorothiazide 37.5-25 MG per capsule   Commonly known as:  DYAZIDE   Used for:  Dizziness, SNHL (sensory-neural hearing loss), asymmetrical, Meniere disease, right   Started by:  Christoph De MD        One tab PO Q Days x 90 days   Quantity:  90 capsule   Refills:  1            Where to get your medicines      Some of these will need a paper prescription and others can be bought over the counter.  Ask your nurse if you have questions.     Bring a paper prescription for each of these medications     triamterene-hydrochlorothiazide 37.5-25 MG per capsule                Primary Care Provider    No Ref Primary Verified Flk Only       No address on file        Thank you!     Thank you for choosing [facility] AUDIOLOGY  for your care. Our goal is always to provide you with excellent care. Hearing back from our patients is one way we can continue to improve our services. Please take a few minutes to complete the written survey that you may receive in the mail after your visit with us. Thank you!             Your Updated Medication List - Protect others around you: Learn how to safely use, store and throw away your medicines at www.disposemymeds.org.          This list is accurate as of: 6/1/17 11:59 PM.  Always use your most  recent med list.                   Brand Name Dispense Instructions for use    Multi-vitamin Tabs tablet      Take 1 tablet by mouth daily       TRIAMTERENE PO      Take 37.5 mg by mouth       triamterene-hydrochlorothiazide 37.5-25 MG per capsule    DYAZIDE    90 capsule    One tab PO Q Days x 90 days

## 2017-06-01 NOTE — MR AVS SNAPSHOT
After Visit Summary   6/1/2017    Karolina Winn    MRN: [id number]           Patient Information     Date Of Birth          1945        Visit Information        Provider Department      6/1/2017 2:30 PM Christoph De MD [facility] Ear Nose and Throat        Today's Diagnoses     Dizziness    -  1    SNHL (sensory-neural hearing loss), asymmetrical        Meniere disease, right          Care Instructions    The patient presents with a history of dizziness and asymmetric sensorineural hearing loss.  She appears to have meniere's disease by vestibular and hearing and testing and she has no evidence of retrocochlear pathology or intracranial pathology. She will be initiated on dyazide and she will follow up with neurotologist Dr. Rick Nissen for further management.           Follow-ups after your visit        Your next 10 appointments already scheduled     Jun 06, 2017  4:00 PM CDT   (Arrive by 3:45 PM)   NEW NEUROTOLOGY VISIT with Rick L Nissen, MD   [facility] Ear Nose and Throat ([email or web address] and Surgery [place])    [address] [place] [id number] [phone]              Who to contact     Please call your clinic at [phone] to:    Ask questions about your health    Make or cancel appointments    Discuss your medicines    Learn about your test results    Speak to your doctor   If you have compliments or concerns about an experience at your clinic, or if you wish to file a complaint, please contact [facility] Patient Relations at [phone] or email us at [email or web address]         Additional Information About Your Visit        MyChart Information     Seed&Spark is an electronic gateway that provides easy, online access to your medical records. With [employer], you can request a clinic appointment, read your test results, renew a prescription or communicate with your care team.     To sign up for Seed&[employer]ark visit the website  "at [email or web address]   You will be asked to enter the access code listed below, as well as some personal information. Please follow the directions to create your username and password.     Your access code is: [email or web address]  Expires: 8/15/2017  6:30 AM     Your access code will  in 90 days. If you need help or a new code, please contact your [facility] or call [phone] for assistance.        Care EveryWhere ID     This is your Care EveryWhere ID. This could be used by other organizations to access your [place] medical records  [id number]        Your Vitals Were     Height BMI (Body Mass Index)                1.47 m (4' 9.87\") 28.76 kg/m2           Blood Pressure from Last 3 Encounters:   No data found for BP    Weight from Last 3 Encounters:   17 62.1 kg (137 lb)              Today, you had the following     No orders found for display         Today's Medication Changes          These changes are accurate as of: 17  3:29 PM.  If you have any questions, ask your nurse or doctor.               Start taking these medicines.        Dose/Directions    triamterene-hydrochlorothiazide 37.5-25 MG per capsule   Commonly known as:  DYAZIDE   Used for:  Dizziness, SNHL (sensory-neural hearing loss), asymmetrical, Meniere disease, right   Started by:  Christoph De MD        One tab PO Q Days x 90 days   Quantity:  90 capsule   Refills:  1            Where to get your medicines      Some of these will need a paper prescription and others can be bought over the counter.  Ask your nurse if you have questions.     Bring a paper prescription for each of these medications     triamterene-hydrochlorothiazide 37.5-25 MG per capsule                Primary Care Provider    No Ref Primary Verified Flk Only       No address on file        Thank you!     Thank you for choosing [facility] EAR NOSE AND THROAT  for your care. Our goal is always to provide you with " excellent care. Hearing back from our patients is one way we can continue to improve our services. Please take a few minutes to complete the written survey that you may receive in the mail after your visit with us. Thank you!             Your Updated Medication List - Protect others around you: Learn how to safely use, store and throw away your medicines at www.disposemymeds.org.          This list is accurate as of: 6/1/17  3:29 PM.  Always use your most recent med list.                   Brand Name Dispense Instructions for use    Multi-vitamin Tabs tablet      Take 1 tablet by mouth daily       TRIAMTERENE PO      Take 37.5 mg by mouth       triamterene-hydrochlorothiazide 37.5-25 MG per capsule    DYAZIDE    90 capsule    One tab PO Q Days x 90 days

## 2017-06-06 ENCOUNTER — OFFICE VISIT (OUTPATIENT)
Dept: OTOLARYNGOLOGY | Facility: CLINIC | Age: 72
End: 2017-06-06

## 2017-06-06 DIAGNOSIS — H81.01 MENIERE'S DISEASE, RIGHT EAR: ICD-10-CM

## 2017-06-06 DIAGNOSIS — H61.23 EXCESSIVE CERUMEN IN BOTH EAR CANALS: ICD-10-CM

## 2017-06-06 DIAGNOSIS — R42 DIZZINESS: Primary | ICD-10-CM

## 2017-06-06 ASSESSMENT — PAIN SCALES - GENERAL: PAINLEVEL: NO PAIN (0)

## 2017-06-06 NOTE — NURSING NOTE
Chief Complaint   Patient presents with     Consult     New Neurotology - Right Ear Noise      Pt states no pain today.    N Claudio CHRISTINA

## 2017-06-06 NOTE — MR AVS SNAPSHOT
After Visit Summary   6/6/2017    Karolina Winn    MRN: [id number]           Patient Information     Date Of Birth          1945        Visit Information        Provider Department      6/6/2017 4:00 PM Nissen, Rick L, MD [facility]        Today's Diagnoses     Dizziness    -  1    Meniere's disease, right ear        Excessive cerumen in both ear canals          Care Instructions    Please follow up with Dr. Nissen in 4-5 weeks with a hearing test prior to appointment.    Thank you.          Follow-ups after your visit        Your next 10 appointments already scheduled     Jul 11, 2017 10:00 AM CDT   Walk In From ENT with Dinora German [facility] Audiology ([facility])    [address] [place] [id number] [phone]            Jul 11, 2017 11:45 AM CDT   (Arrive by 11:30 AM)   RETURN NEUROTOLOGY with Rick L Nissen, MD [facility] Ear Nose and Throat ([facility])    [address] [place] [id number] [phone]              Who to contact     Please call your clinic at [phone] to:    Ask questions about your health    Make or cancel appointments    Discuss your medicines    Learn about your test results    Speak to your doctor   If you have compliments or concerns about an experience at your clinic, or if you wish to file a complaint, please contact [facility] Physicians Patient Relations at [phone] or email us at [email or web address]         Additional Information About Your Visit        MyChart Information     Zayat is an electronic gateway that provides easy, online access to your medical records. With [employer], you can request a clinic appointment, read your test results, renew a prescription or communicate with your care team.     To sign up for Zayat visit the website at [email or web address]   You will be asked to  enter the access code listed below, as well as some personal information. Please follow the directions to create your username and password.     Your access code is: [email or web address]  Expires: 8/15/2017  6:30 AM     Your access code will  in 90 days. If you need help or a new code, please contact your [facility] Clinic or call [phone] for assistance.        Care EveryWhere ID     This is your Care EveryWhere ID. This could be used by other organizations to access your [place] medical records  [id number]         Blood Pressure from Last 3 Encounters:   No data found for BP    Weight from Last 3 Encounters:   17 62.1 kg (137 lb)              We Performed the Following     BINOCULAR MICROSCOPY          Today's Medication Changes          These changes are accurate as of: 17  5:05 PM.  If you have any questions, ask your nurse or doctor.               Start taking these medicines.        Dose/Directions    diphenhydrAMINE HCl 50 MG Tabs   Used for:  Dizziness   Started by:  Nissen, Rick L, MD        Take 1 tablet by mouth every bed time   Quantity:  60 tablet   Refills:  2            Where to get your medicines      These medications were sent to [facility] [place] [id number]    Hours:  TRANSPLANT PHONE NUMBER [phone] Phone:  [phone]     diphenhydrAMINE HCl 50 MG Tabs                Primary Care Provider    No Ref Primary Verified Flk Only       No address on file        Thank you!     Thank you for choosing [facility] EAR NOSE AND THROAT  for your care. Our goal is always to provide you with excellent care. Hearing back from our patients is one way we can continue to improve our services. Please take a few minutes to complete the written survey that you may receive in the mail after your visit with us. Thank you!             Your Updated Medication List - Protect  others around you: Learn how to safely use, store and throw away your medicines at www.disposemymeds.org.          This list is accurate as of: 6/6/17  5:05 PM.  Always use your most recent med list.                   Brand Name Dispense Instructions for use    diphenhydrAMINE HCl 50 MG Tabs     60 tablet    Take 1 tablet by mouth every bed time       Multi-vitamin Tabs tablet      Take 1 tablet by mouth daily       TRIAMTERENE PO      Take 37.5 mg by mouth       triamterene-hydrochlorothiazide 37.5-25 MG per capsule    DYAZIDE    90 capsule    One tab PO Q Days x 90 days       TYLENOL PO

## 2017-06-06 NOTE — PROGRESS NOTES
Dear Dr. Canelo Menezes Only, No Ref Primary:    I had the pleasure of meeting Karolina Winn in consultation today at the [facility] at your request.    HISTORY OF PRESENT ILLNESS: The patient is a 72-year-old in today for assessment of dizziness.  She has had occasional episodes of dizziness for 10+ years.  For the past three years it has been frequent and persistent.  She describes a motion sensation with these episodes.  They can last for 30 minutes to an hour.  They can occur at any time.  She says she is getting two to three episodes a week.  With them she notices the right hearing fluctuates.  She does have pretty significant right hearing loss that has developed over the years.  She also has right tinnitus that increases with the episodes.  She does get a little warning the attacks are coming with the tinnitus onset.  She has no tinnitus in her left ear.  She denies any pain or drainage.  There is no dysphagia, hoarseness or facial paresthesias.  There is report of an MRI scan that was normal, otherwise I cannot find that in our records here.         ALLERGIES:    Allergies   Allergen Reactions     Penicillins        HABITS:   Alcohol use No    History   Smoking Status     Never Smoker   Smokeless Tobacco     Never Used         PAST MEDICAL HISTORY: Please see today's intake form (for the remainder of the PMH) which I reviewed and signed.  Past Medical History:   Diagnosis Date     Arthritis      Benign positional vertigo      Conductive hearing loss      Hearing problem      Meniere's disease      Reduced vision      Sensorineural hearing loss      Uncomplicated asthma        FAMILY HISTORY/SOCIAL HISTORY:   Family History   Problem Relation Age of Onset     CANCER Mother     Social History     Social History     Marital status: Single     Spouse name: N/A     Number of children: N/A     Years of education: N/A     Occupational History     Not on file.     Social History Main  Topics     Smoking status: Never Smoker     Smokeless tobacco: Never Used     Alcohol use No     Drug use: No     Sexual activity: No     Other Topics Concern     Not on file     Social History Narrative       REVIEW OF SYSTEMS: Please see today's intake form (for the remainder of the ROS) which I have reviewed and signed.    PHYSICIAL EXAMINATION:  Constitutional: The patient was well-groomed and in no acute distress.   Skin: Warm and pink.  Psychiatric: The patient's affect was calm, cooperative, and appropriate.   Respiratory: Breathing comfortably without stridor or exertion of accessory muscles.  Eyes: Pupils were equal and reactive. Extraocular movement intact.   Head: Normocephalic and atraumatic. No lesions or scars.  Ears: Both ears are examined under the microscope for microscopic assessment and cleaning.  The right side was cleaned with curettes of cerumen.  Following cleaning, tympanic membrane and middle ear look normal.  The opposite ear was cleaned and examined using the microscope, curette and similar techniques.  Tympanic membrane and middle ear look normal after cleaning.   Nose: Sinuses were nontender. Anterior rhinoscopy revealed midline septum and absence of purulence or polyps.  Oral Cavity: Normal tongue, floor of moth, buccal mucosa, and palate. No lesions or masses on inspection or palpation. No abnormal lymph tissue in the oropharynx.   Neck: The parotid is soft without masses. Supple with normal laryngeal and tracheal landmarks.   Lymphatic: There is no palpable lymphadenopathy or other masses in the neck.   Neurologic: Alert and oriented x 3. Cranial nerves III-XI within normal limits. Voice quality normal.  Cerebellar Function Tests:  Grossly normal    Audiogram:  AUDIOGRAM:  Audiogram performed shows a mild high-frequency sensorineural hearing loss on the left side above 2000 Hz with discrimination at 100%.  The right side shows a moderate severe, mainly low frequency sensorineural  hearing loss with discrimination at 60%.         IMPRESSION AND PLAN: I talked with her for some time and went over what looks to be right Meniere's.  Meniere's discussed with her in detail.  She has been on Dyazide now in the past and was restarted on that recently.  Recommend she continue with the Dyazide and add an antihistamine.  Benadryl 50 mg at night.  Talked about salt and caffeine which she is trying to watch.  Also talked about transtympanic steroid injection to the right ear as she is having continued problems.  Risks were discussed with persistent perforation, drainage issues, unforeseen complications, etc.  She desires to proceed.  Injection was completed without incident.  Plan is to start on the medications and will see her back in three to four weeks to reassess and probable second injection.  She lives in [place], she will try to get back.      PROCEDURE NOTE:  The patient was placed supine under the microscope.  Under high-powered magnification, the right side was examined.  A drop of phenol was placed on the posterior superior quadrant.  Using a 25-gauge needle, dexamethasone in a solution of 24 mg/mL was injected into the right middle ear.  Total injection of about 1 cc.  She remained supine for 20 minutes and was released to her own care.         Thank you very much for the opportunity to participate in the care of your patient.    Rick L Nissen MD

## 2017-06-06 NOTE — LETTER
6/6/2017       RE: Karolina Winn  [address] [place] [address]     Dear Colleague,    Thank you for referring your patient, Karolina Winn, to the [facility] EAR NOSE AND THROAT at [facility]. Please see a copy of my visit note below.    Dear Dr. Canelo Menezes Only, No Ref Primary:    I had the pleasure of meeting Karolina Winn in consultation today at the [facility] at your request.    HISTORY OF PRESENT ILLNESS: The patient is a 72-year-old in today for assessment of dizziness.  She has had occasional episodes of dizziness for 10+ years.  For the past three years it has been frequent and persistent.  She describes a motion sensation with these episodes.  They can last for 30 minutes to an hour.  They can occur at any time.  She says she is getting two to three episodes a week.  With them she notices the right hearing fluctuates.  She does have pretty significant right hearing loss that has developed over the years.  She also has right tinnitus that increases with the episodes.  She does get a little warning the attacks are coming with the tinnitus onset.  She has no tinnitus in her left ear.  She denies any pain or drainage.  There is no dysphagia, hoarseness or facial paresthesias.  There is report of an MRI scan that was normal, otherwise I cannot find that in our records here.         ALLERGIES:    Allergies   Allergen Reactions     Penicillins        HABITS:   Alcohol use No    History   Smoking Status     Never Smoker   Smokeless Tobacco     Never Used         PAST MEDICAL HISTORY: Please see today's intake form (for the remainder of the PMH) which I reviewed and signed.  Past Medical History:   Diagnosis Date     Arthritis      Benign positional vertigo      Conductive hearing loss      Hearing problem      Meniere's disease      Reduced vision      Sensorineural hearing loss      Uncomplicated asthma        FAMILY HISTORY/SOCIAL HISTORY:    Family History   Problem Relation Age of Onset     CANCER Mother     Social History     Social History     Marital status: Single     Spouse name: N/A     Number of children: N/A     Years of education: N/A     Occupational History     Not on file.     Social History Main Topics     Smoking status: Never Smoker     Smokeless tobacco: Never Used     Alcohol use No     Drug use: No     Sexual activity: No     Other Topics Concern     Not on file     Social History Narrative       REVIEW OF SYSTEMS: Please see today's intake form (for the remainder of the ROS) which I have reviewed and signed.    PHYSICIAL EXAMINATION:  Constitutional: The patient was well-groomed and in no acute distress.   Skin: Warm and pink.  Psychiatric: The patient's affect was calm, cooperative, and appropriate.   Respiratory: Breathing comfortably without stridor or exertion of accessory muscles.  Eyes: Pupils were equal and reactive. Extraocular movement intact.   Head: Normocephalic and atraumatic. No lesions or scars.  Ears: Both ears are examined under the microscope for microscopic assessment and cleaning.  The right side was cleaned with curettes of cerumen.  Following cleaning, tympanic membrane and middle ear look normal.  The opposite ear was cleaned and examined using the microscope, curette and similar techniques.  Tympanic membrane and middle ear look normal after cleaning.   Nose: Sinuses were nontender. Anterior rhinoscopy revealed midline septum and absence of purulence or polyps.  Oral Cavity: Normal tongue, floor of moth, buccal mucosa, and palate. No lesions or masses on inspection or palpation. No abnormal lymph tissue in the oropharynx.   Neck: The parotid is soft without masses. Supple with normal laryngeal and tracheal landmarks.   Lymphatic: There is no palpable lymphadenopathy or other masses in the neck.   Neurologic: Alert and oriented x 3. Cranial nerves III-XI within normal limits. Voice quality normal.  Cerebellar  Function Tests:  Grossly normal    Audiogram:  AUDIOGRAM:  Audiogram performed shows a mild high-frequency sensorineural hearing loss on the left side above 2000 Hz with discrimination at 100%.  The right side shows a moderate severe, mainly low frequency sensorineural hearing loss with discrimination at 60%.         IMPRESSION AND PLAN: I talked with her for some time and went over what looks to be right Meniere's.  Meniere's discussed with her in detail.  She has been on Dyazide now in the past and was restarted on that recently.  Recommend she continue with the Dyazide and add an antihistamine.  Benadryl 50 mg at night.  Talked about salt and caffeine which she is trying to watch.  Also talked about transtympanic steroid injection to the right ear as she is having continued problems.  Risks were discussed with persistent perforation, drainage issues, unforeseen complications, etc.  She desires to proceed.  Injection was completed without incident.  Plan is to start on the medications and will see her back in three to four weeks to reassess and probable second injection.  She lives in [place], she will try to get back.      PROCEDURE NOTE:  The patient was placed supine under the microscope.  Under high-powered magnification, the right side was examined.  A drop of phenol was placed on the posterior superior quadrant.  Using a 25-gauge needle, dexamethasone in a solution of 24 mg/mL was injected into the right middle ear.  Total injection of about 1 cc.  She remained supine for 20 minutes and was released to her own care.         Thank you very much for the opportunity to participate in the care of your patient.    Rick L Nissen MD

## 2017-06-06 NOTE — PATIENT INSTRUCTIONS
Please follow up with Dr. Nissen in 4-5 weeks with a hearing test prior to appointment.    Thank you.

## 2017-07-10 DIAGNOSIS — R42 DIZZINESS: Primary | ICD-10-CM

## 2017-07-11 ENCOUNTER — OFFICE VISIT (OUTPATIENT)
Dept: AUDIOLOGY | Facility: CLINIC | Age: 72
End: 2017-07-11

## 2017-07-11 ENCOUNTER — OFFICE VISIT (OUTPATIENT)
Dept: OTOLARYNGOLOGY | Facility: CLINIC | Age: 72
End: 2017-07-11

## 2017-07-11 VITALS — WEIGHT: 137 LBS | BODY MASS INDEX: 28.76 KG/M2 | HEIGHT: 58 IN

## 2017-07-11 DIAGNOSIS — H90.A31 MIXED CONDUCTIVE AND SENSORINEURAL HEARING LOSS OF RIGHT EAR WITH RESTRICTED HEARING OF LEFT EAR: ICD-10-CM

## 2017-07-11 DIAGNOSIS — H90.A22 SENSORINEURAL HEARING LOSS (SNHL) OF LEFT EAR WITH RESTRICTED HEARING OF RIGHT EAR: Primary | ICD-10-CM

## 2017-07-11 DIAGNOSIS — H81.01 MENIERE'S DISEASE, RIGHT EAR: Primary | ICD-10-CM

## 2017-07-11 ASSESSMENT — PAIN SCALES - GENERAL: PAINLEVEL: MILD PAIN (3)

## 2017-07-11 NOTE — MR AVS SNAPSHOT
After Visit Summary   2017    Karolina Winn    MRN: [id number]           Patient Information     Date Of Birth          1945        Visit Information        Provider Department      2017 10:00 AM Chloe Pandya [facility] Audiology        Today's Diagnoses     Sensorineural hearing loss (SNHL) of left ear with restricted hearing of right ear    -  1    Mixed conductive and sensorineural hearing loss of right ear with restricted hearing of left ear           Follow-ups after your visit        Your next 10 appointments already scheduled     2017 11:45 AM CDT   (Arrive by 11:30 AM)   RETURN NEUROTOLOGY with Rick L Nissen, MD [facility] Ear Nose and Throat ([facility])    [address] [place] [id number] [phone]              Who to contact     Please call your clinic at [phone] to:    Ask questions about your health    Make or cancel appointments    Discuss your medicines    Learn about your test results    Speak to your doctor   If you have compliments or concerns about an experience at your clinic, or if you wish to file a complaint, please contact [facility] Patient Relations at [phone] or email us at [email or web address]         Additional Information About Your Visit        MyChart Information     [employer] is an electronic gateway that provides easy, online access to your medical records. With [employer], you can request a clinic appointment, read your test results, renew a prescription or communicate with your care team.     To sign up for [employer] visit the website at [email or web address]   You will be asked to enter the access code listed below, as well as some personal information. Please follow the directions to create your username and password.     Your access code is: [email or web address]  Expires: 8/15/2017  6:30 AM     Your access code will  in 90 days. If you  need help or a new code, please contact your [facility] or call [phone] for assistance.        Care EveryWhere ID     This is your Care EveryWhere ID. This could be used by other organizations to access your [place] medical records  [id number]         Blood Pressure from Last 3 Encounters:   No data found for BP    Weight from Last 3 Encounters:   06/01/17 62.1 kg (137 lb)              We Performed the Following     AUDIOGRAM/TYMPANOGRAM - INTERFACE     [facility] Speech Recog ([address])     Tymps / Reflex   ([address])        Primary Care Provider    No Ref Primary Verified Flk Only       No address on file        Equal Access to Services     [facility] : Hadii aad ku hadasho Soomaali, waaxda luqadaha, qaybta kaalmada adeegyada, waxay idiin hayaan adeeg kharanatividad labenjamin . So [facility] [phone].    ATENCIÓN: Si habla español, tiene a blanton disposición servicios gratuitos de asistencia lingüística. Llame al [phone].    We comply with applicable federal civil rights laws and [place] laws. We do not discriminate on the basis of race, color, national origin, age, disability sex, sexual orientation or gender identity.            Thank you!     Thank you for choosing [facility] AUDIOLOGY  for your care. Our goal is always to provide you with excellent care. Hearing back from our patients is one way we can continue to improve our services. Please take a few minutes to complete the written survey that you may receive in the mail after your visit with us. Thank you!             Your Updated Medication List - Protect others around you: Learn how to safely use, store and throw away your medicines at www.disposemymeds.org.          This list is accurate as of: 7/11/17 11:08 AM.  Always use your most recent med list.                   Brand Name Dispense Instructions for use Diagnosis    diphenhydrAMINE HCl 50 MG Tabs     60 tablet    Take 1 tablet by mouth every bed time     Dizziness       Multi-vitamin Tabs tablet      Take 1 tablet by mouth daily        TRIAMTERENE PO      Take 37.5 mg by mouth        triamterene-hydrochlorothiazide 37.5-25 MG per capsule    DYAZIDE    90 capsule    One tab PO Q Days x 90 days    Dizziness, SNHL (sensory-neural hearing loss), asymmetrical, Meniere disease, right       TYLENOL PO

## 2017-07-11 NOTE — LETTER
7/11/2017       RE: Karolina Winn  [address] [place] [address]     Dear Colleague,    Thank you for referring your patient, Karolina Winn, to the [facility] EAR NOSE AND THROAT at [facility]. Please see a copy of my visit note below.    Dear Dr. Canelo Menezes Only, No Ref Primary:    I had the pleasure of seeing Karolina Winn in followup today at the [facility] Otolaryngology Clinic.    HISTORY OF PRESENT ILLNESS: Patient is a 72-year-old in today for follow-up. 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 She did undergo her first transtympanic steroid injection at that time. On her follow-up today, she says she is doing much better in that she's only had 2 attacks of vertigo since her last visit. She was getting almost daily attacks of vertigo before the injection. She also has been on Dyazide and Benadryl at nighttime. She feels her hearing on the right side has been fluctuating and today it does seem to be a down day. She has been having some mild dizziness this morning, again she's only had 2 other episodes since her last visit. Her right tinnitus is basically unchanged.    MEDICATIONS: Please refer to the detailed medication reconciliation performed by my nurse today, which I have reviewed and signed.     ALLERGIES:    Allergies   Allergen Reactions     Penicillins        HABITS:   Alcohol use No    History   Smoking Status     Never Smoker   Smokeless Tobacco     Never Used         PAST MEDICAL HISTORY:  Please see today's intake form (for the remainder of the PMH) which I reviewed and signed.  Past Medical History:   Diagnosis Date     Arthritis      Benign positional vertigo      Conductive hearing loss      Hearing problem      Meniere's disease      Reduced vision      Sensorineural hearing loss      Uncomplicated asthma        FAMILY HISTORY/SOCIAL HISTORY:    Family History   Problem Relation Age of Onset     CANCER Mother      Social History     Social History     Marital status: Single     Spouse name: N/A     Number of children: N/A     Years of education: N/A     Occupational History     Not on file.     Social History Main Topics     Smoking status: Never Smoker     Smokeless tobacco: Never Used     Alcohol use No     Drug use: No     Sexual activity: No     Other Topics Concern     Not on file     Social History Narrative       REVIEW OF SYSTEMS: Please see today's intake form (for the remainder of the ROS) which I have reviewed and signed.    PHYSICIAL EXAMINATION:  Constitutional: The patient was well-groomed and in no acute distress.   Skin: Warm and pink.  Psychiatric: The patient's affect was calm, cooperative, and appropriate.   Respiratory: Breathing comfortably without stridor or exertion of accessory muscles.  Eyes: Pupils were equal and reactive. Extraocular movement intact.   Head: Normocephalic and atraumatic. No lesions or scars.  Ears: Both ears examined under microscope for microscopic assessment and cleaning. Right side was cleaned with curet of cerumen and the old injection site has healed. TM is intact. Left ear shows normal TM and middle ear after cleaning of cerumen using similar techniques.  Nose: Sinuses were nontender. Anterior rhinoscopy revealed midline septum and absence of purulence or polyps.  Oral Cavity: Normal tongue, floor of moth, buccal mucosa, and palate. No abnormal lymph tissue in the oropharynx.   Neck: The parotid is soft without masses. Supple with normal laryngeal and tracheal landmarks.   Lymphatic: There is no palpable lymphadenopathy or other masses in the neck.   Neurologic: Alert and oriented x 3. Cranial nerves III-XI within normal limits. Voice quality normal.  Cerebellar Function Tests:  Grossly normal    Audiogram:  Audiogram performed shows normal hearing in the left ear. The right ear shows a moderate severe sensorineural hearing loss through all frequencies, about a 15-20 dB  increase loss in the higher frequencies and her discrimination at 16% today, from 60% last visit.    IMPRESSION AND PLAN: Discussed with her her right Ménière's again. She is doing much better from a dizziness standpoint, her hearing has continued to fluctuate. We discussed going ahead with a second injection today and she is to continue with her medications and diet. Risk of injection discussed and she desires to proceed.  Second Injection completed without incident and she will be back in town mid-August and will return assessment at that time. She lives in [place] and comes here frequently where she used to live and her cousin lives.    Procedure note:  Patient placed supine on the microscope. Under high-power magnification the right ear inspected and drop of phenol is placed on the posterior superior quadrant. Dexamethasone and a solution of 24 MG's. ML was injected into the right middle ear, total injection of 1 cc. She remained supine for 20 minutes and was released to her own care.    Thank you very much for the opportunity to participate in the care of your patient.    Rick L Nissen MD

## 2017-07-11 NOTE — PROGRESS NOTES
Dear  Verified Flk Only, No Ref Primary:    I had the pleasure of seeing Karolina Winn in followup today at the [facility] Otolaryngology [facility].    HISTORY OF PRESENT ILLNESS: Patient is a 72-year-old in today for follow-up. 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 She did undergo her first transtympanic steroid injection at that time. On her follow-up today, she says she is doing much better in that she's only had 2 attacks of vertigo since her last visit. She was getting almost daily attacks of vertigo before the injection. She also has been on Dyazide and Benadryl at nighttime. She feels her hearing on the right side has been fluctuating and today it does seem to be a down day. She has been having some mild dizziness this morning, again she's only had 2 other episodes since her last visit. Her right tinnitus is basically unchanged.    MEDICATIONS: Please refer to the detailed medication reconciliation performed by my nurse today, which I have reviewed and signed.     ALLERGIES:    Allergies   Allergen Reactions     Penicillins        HABITS:   Alcohol use No    History   Smoking Status     Never Smoker   Smokeless Tobacco     Never Used         PAST MEDICAL HISTORY:  Please see today's intake form (for the remainder of the PMH) which I reviewed and signed.  Past Medical History:   Diagnosis Date     Arthritis      Benign positional vertigo      Conductive hearing loss      Hearing problem      Meniere's disease      Reduced vision      Sensorineural hearing loss      Uncomplicated asthma        FAMILY HISTORY/SOCIAL HISTORY:    Family History   Problem Relation Age of Onset     CANCER Mother     Social History     Social History     Marital status: Single     Spouse name: N/A     Number of children: N/A     Years of education: N/A     Occupational History     Not on file.     Social History Main Topics     Smoking status: Never Smoker     Smokeless tobacco: Never  Used     Alcohol use No     Drug use: No     Sexual activity: No     Other Topics Concern     Not on file     Social History Narrative       REVIEW OF SYSTEMS: Please see today's intake form (for the remainder of the ROS) which I have reviewed and signed.    PHYSICIAL EXAMINATION:  Constitutional: The patient was well-groomed and in no acute distress.   Skin: Warm and pink.  Psychiatric: The patient's affect was calm, cooperative, and appropriate.   Respiratory: Breathing comfortably without stridor or exertion of accessory muscles.  Eyes: Pupils were equal and reactive. Extraocular movement intact.   Head: Normocephalic and atraumatic. No lesions or scars.  Ears: Both ears examined under microscope for microscopic assessment and cleaning. Right side was cleaned with curet of cerumen and the old injection site has healed. TM is intact. Left ear shows normal TM and middle ear after cleaning of cerumen using similar techniques.  Nose: Sinuses were nontender. Anterior rhinoscopy revealed midline septum and absence of purulence or polyps.  Oral Cavity: Normal tongue, floor of moth, buccal mucosa, and palate. No abnormal lymph tissue in the oropharynx.   Neck: The parotid is soft without masses. Supple with normal laryngeal and tracheal landmarks.   Lymphatic: There is no palpable lymphadenopathy or other masses in the neck.   Neurologic: Alert and oriented x 3. Cranial nerves III-XI within normal limits. Voice quality normal.  Cerebellar Function Tests:  Grossly normal    Audiogram:  Audiogram performed shows normal hearing in the left ear. The right ear shows a moderate severe sensorineural hearing loss through all frequencies, about a 15-20 dB increase loss in the higher frequencies and her discrimination at 16% today, from 60% last visit.    IMPRESSION AND PLAN: Discussed with her her right Ménière's again. She is doing much better from a dizziness standpoint, her hearing has continued to fluctuate. We discussed  going ahead with a second injection today and she is to continue with her medications and diet. Risk of injection discussed and she desires to proceed.  Second Injection completed without incident and she will be back in town mid-August and will return assessment at that time. She lives in [place] and comes here frequently where she used to live and her cousin lives.    Procedure note:  Patient placed supine on the microscope. Under high-power magnification the right ear inspected and drop of phenol is placed on the posterior superior quadrant. Dexamethasone and a solution of 24 MG's. ML was injected into the right middle ear, total injection of 1 cc. She remained supine for 20 minutes and was released to her own care.    Thank you very much for the opportunity to participate in the care of your patient.    Rick L Nissen MD

## 2017-07-11 NOTE — PROGRESS NOTES
AUDIOLOGY REPORT    SUMMARY: Audiology visit completed. See audiogram for results.      RECOMMENDATIONS: Follow-up with ENT.      Yamileth Hussein, CCC-A  Licensed Audiologist  [place] #[id number]

## 2017-07-11 NOTE — NURSING NOTE
Chief Complaint   Patient presents with     RECHECK     recheck hearing with audio     Radha Whitehead Medical Assistant

## 2017-09-25 DIAGNOSIS — H81.01 MENIERE'S DISEASE, RIGHT EAR: Primary | ICD-10-CM

## 2017-09-26 ENCOUNTER — OFFICE VISIT (OUTPATIENT)
Dept: OTOLARYNGOLOGY | Facility: CLINIC | Age: 72
End: 2017-09-26

## 2017-09-26 ENCOUNTER — OFFICE VISIT (OUTPATIENT)
Dept: AUDIOLOGY | Facility: CLINIC | Age: 72
End: 2017-09-26

## 2017-09-26 VITALS — HEIGHT: 58 IN | BODY MASS INDEX: 27.92 KG/M2 | WEIGHT: 133 LBS

## 2017-09-26 DIAGNOSIS — H90.3 ASYMMETRICAL SENSORINEURAL HEARING LOSS: ICD-10-CM

## 2017-09-26 DIAGNOSIS — H61.23 EXCESSIVE CERUMEN IN BOTH EAR CANALS: ICD-10-CM

## 2017-09-26 DIAGNOSIS — H81.01 MENIERE'S DISEASE, RIGHT EAR: Primary | ICD-10-CM

## 2017-09-26 DIAGNOSIS — H90.A31 MIXED CONDUCTIVE AND SENSORINEURAL HEARING LOSS OF RIGHT EAR WITH RESTRICTED HEARING OF LEFT EAR: ICD-10-CM

## 2017-09-26 DIAGNOSIS — H90.A22 SENSORINEURAL HEARING LOSS (SNHL) OF LEFT EAR WITH RESTRICTED HEARING OF RIGHT EAR: ICD-10-CM

## 2017-09-26 RX ORDER — TRIAMTERENE AND HYDROCHLOROTHIAZIDE 37.5; 25 MG/1; MG/1
CAPSULE ORAL
Qty: 90 CAPSULE | Refills: 1 | Status: SHIPPED | OUTPATIENT
Start: 2017-09-26 | End: 2019-04-16

## 2017-09-26 ASSESSMENT — PAIN SCALES - GENERAL: PAINLEVEL: MILD PAIN (2)

## 2017-09-26 NOTE — MR AVS SNAPSHOT
After Visit Summary   2017    Karolina Winn    MRN: [id number]           Patient Information     Date Of Birth          1945        Visit Information        Provider Department      2017 12:00 PM Chloe Pandya AuD [facility] Audiology        Today's Diagnoses     Sensorineural hearing loss (SNHL) of left ear with restricted hearing of right ear        Mixed conductive and sensorineural hearing loss of right ear with restricted hearing of left ear           Follow-ups after your visit        Your next 10 appointments already scheduled     Sep 26, 2017  1:00 PM CDT   (Arrive by 12:45 PM)   RETURN NEUROTOLOGY with Rick L Nissen, MD M [facility] Ear Nose and Throat ([facility])    [address] [place] [id number] [phone]              Who to contact     Please call your clinic at [phone] to:    Ask questions about your health    Make or cancel appointments    Discuss your medicines    Learn about your test results    Speak to your doctor   If you have compliments or concerns about an experience at your clinic, or if you wish to file a complaint, please contact [facility] Patient Relations at [phone] or email us at [email or web address]         Additional Information About Your Visit        MyChart Information     Punch Bowl Socialt is an electronic gateway that provides easy, online access to your medical records. With Sitemasher, you can request a clinic appointment, read your test results, renew a prescription or communicate with your care team.     To sign up for Punch Bowl Socialt visit the website at [email or web address]   You will be asked to enter the access code listed below, as well as some personal information. Please follow the directions to create your username and password.     Your access code is: [email or web address]  Expires: 2017  6:30 AM     Your access code will  in 90 days. If you need  help or a new code, please contact your [facility] or call [phone] for assistance.        Care EveryWhere ID     This is your Care EveryWhere ID. This could be used by other organizations to access your [place] medical records  [id number]         Blood Pressure from Last 3 Encounters:   No data found for BP    Weight from Last 3 Encounters:   07/11/17 62.1 kg (137 lb)   06/01/17 62.1 kg (137 lb)              We Performed the Following     AUDIOGRAM/TYMPANOGRAM - INTERFACE     [facility] ([address])     Tymps / Reflex   (58573)        Primary Care Provider    None Specified       No primary provider on file.        Equal Access to Services     SURENDRA SOLARES : Vinh bhandario Soyulissa, waaxda luqadaha, qaybta kaalmada adedanielyada, moris dow . So [facility] [phone].    ATENCIÓN: Si habla español, tiene a blanton disposición servicios gratuitos de asistencia lingüística. Llame al [phone].    We comply with applicable federal civil rights laws and [place] laws. We do not discriminate on the basis of race, color, national origin, age, disability sex, sexual orientation or gender identity.            Thank you!     Thank you for choosing [facility] AUDIOLOGY  for your care. Our goal is always to provide you with excellent care. Hearing back from our patients is one way we can continue to improve our services. Please take a few minutes to complete the written survey that you may receive in the mail after your visit with us. Thank you!             Your Updated Medication List - Protect others around you: Learn how to safely use, store and throw away your medicines at www.disposemymeds.org.          This list is accurate as of: 9/26/17 12:45 PM.  Always use your most recent med list.                   Brand Name Dispense Instructions for use Diagnosis    diphenhydrAMINE HCl 50 MG Tabs     60 tablet    Take 1 tablet by mouth  every bed time    Dizziness       Multi-vitamin Tabs tablet      Take 1 tablet by mouth daily        TRIAMTERENE PO      Take 37.5 mg by mouth        triamterene-hydrochlorothiazide 37.5-25 MG per capsule    DYAZIDE    90 capsule    One tab PO Q Days x 90 days    Dizziness, SNHL (sensory-neural hearing loss), asymmetrical, Meniere disease, right       TYLENOL PO

## 2017-09-26 NOTE — MR AVS SNAPSHOT
After Visit Summary   2017    Karolina Winn    MRN: [id number]           Patient Information     Date Of Birth          1945        Visit Information        Provider Department      2017 1:00 PM Nissen, [facility] L[facility]        Today's Diagnoses     Meniere's disease, right ear    -  1    Asymmetrical sensorineural hearing loss        Excessive cerumen in both ear canals           Follow-ups after your visit        Who to contact     Please call your clinic at [phone] to:    Ask questions about your health    Make or cancel appointments    Discuss your medicines    Learn about your test results    Speak to your doctor   If you have compliments or concerns about an experience at your clinic, or if you wish to file a complaint, please contact [facility] Patient Relations at [phone] or email us at [email or web address]         Additional Information About Your Visit        MyChart Information     CorMatrixt is an electronic gateway that provides easy, online access to your medical records. With FrameBlast, you can request a clinic appointment, read your test results, renew a prescription or communicate with your care team.     To sign up for CorMatrixt visit the website at [email or web address]   You will be asked to enter the access code listed below, as well as some personal information. Please follow the directions to create your username and password.     Your access code is: [email or web address]  Expires: 2017  6:30 AM     Your access code will  in 90 days. If you need help or a new code, please contact your [facility] or call [phone] for assistance.        Care EveryWhere ID     This is your Care EveryWhere ID. This could be used by other organizations to access your [place] medical records  [id number]        Your Vitals Were     Height BMI (Body Mass Index)                1.47 m  "(4' 9.87\") 27.92 kg/m2           Blood Pressure from Last 3 Encounters:   No data found for BP    Weight from Last 3 Encounters:   09/26/17 60.3 kg (133 lb)   07/11/17 62.1 kg (137 lb)   06/01/17 62.1 kg (137 lb)              We Performed the Following     BINOCULAR MICROSCOPY     LABYRINTHOTOMY W/WO CRYO-GENT INJ TRANSCANAL          Today's Medication Changes          These changes are accurate as of: 9/26/17  6:10 PM.  If you have any questions, ask your nurse or doctor.               These medicines have changed or have updated prescriptions.        Dose/Directions    * triamterene-hydrochlorothiazide 37.5-25 MG per capsule   Commonly known as:  DYAZIDE   This may have changed:  Another medication with the same name was added. Make sure you understand how and when to take each.   Used for:  Dizziness, SNHL (sensory-neural hearing loss), asymmetrical, Meniere disease, right   Changed by:  Christoph De MD        One tab PO Q Days x 90 days   Quantity:  90 capsule   Refills:  1       * triamterene-hydrochlorothiazide 37.5-25 MG per capsule   Commonly known as:  DYAZIDE   This may have changed:  You were already taking a medication with the same name, and this prescription was added. Make sure you understand how and when to take each.   Used for:  Meniere's disease, right ear   Changed by:  Nissen, Rick L, MD        Take 1 cap by mouth every morning   Quantity:  90 capsule   Refills:  1       * Notice:  This list has 2 medication(s) that are the same as other medications prescribed for you. Read the directions carefully, and ask your doctor or other care provider to review them with you.         Where to get your medicines      These medications were sent to [email or web address] [id number]  [place] - [address] AT [facility]  [address] [address]     Phone:  [phone]     triamterene-hydrochlorothiazide 37.5-25 MG per capsule                Primary Care " Provider    None Specified       No primary provider on file.        Equal Access to Services     SURENDRA SOLARES : Hadii aad ku hadaprilredd Manriquez, jaydenjoselo bernard, fallonsalomón bahcandacejoselo harley, moris lopez. So [facility] [phone].    ATENCIÓN: Si habla español, tiene a blanton disposición servicios gratuitos de asistencia lingüística. Llame al [phone].    We comply with applicable federal civil rights laws and [place] laws. We do not discriminate on the basis of race, color, national origin, age, disability sex, sexual orientation or gender identity.            Thank you!     Thank you for choosing [facility] EAR NOSE AND THROAT  for your care. Our goal is always to provide you with excellent care. Hearing back from our patients is one way we can continue to improve our services. Please take a few minutes to complete the written survey that you may receive in the mail after your visit with us. Thank you!             Your Updated Medication List - Protect others around you: Learn how to safely use, store and throw away your medicines at www.disposemymeds.org.          This list is accurate as of: 9/26/17  6:10 PM.  Always use your most recent med list.                   Brand Name Dispense Instructions for use Diagnosis    diphenhydrAMINE HCl 50 MG Tabs     60 tablet    Take 1 tablet by mouth every bed time    Dizziness       Multi-vitamin Tabs tablet      Take 1 tablet by mouth daily        TRIAMTERENE PO      Take 37.5 mg by mouth        * triamterene-hydrochlorothiazide 37.5-25 MG per capsule    DYAZIDE    90 capsule    One tab PO Q Days x 90 days    Dizziness, SNHL (sensory-neural hearing loss), asymmetrical, Meniere disease, right       * triamterene-hydrochlorothiazide 37.5-25 MG per capsule    DYAZIDE    90 capsule    Take 1 cap by mouth every morning    Meniere's disease, right ear       TYLENOL PO           * Notice:  This list has 2 medication(s) that are the same as other medications  prescribed for you. Read the directions carefully, and ask your doctor or other care provider to review them with you.

## 2017-09-26 NOTE — NURSING NOTE
Chief Complaint   Patient presents with     RECHECK     follow up     Radha Whitehead Medical Assistant

## 2017-09-26 NOTE — LETTER
9/26/2017       RE: Karolina Winn  [address] [place] [address]     Dear Colleague,    Thank you for referring your patient, Karolina Winn, to the [facility] EAR NOSE AND THROAT at [facility]. Please see a copy of my visit note below.    Dear Dr. Fung primary care provider on file.:    I had the pleasure of seeing Karolina Winn in followup today at the [facility] Ot[facility].    HISTORY OF PRESENT ILLNESS: Patient is a 72-year-old in today for follow-up on suspected right Ménière's. I initially saw her 6/6/17. She was having daily episodes of vertigo with right hearing loss, suspected right Ménière's. She did have an MRI scan per Dr. De's note, his review said it was normal. I don't actually have that MRI. Any when she underwent an injection at that first visit, on her follow-up last visit, 7/11/17, she had improved and was only having 2 episodes since the first injection. Hearing was still down. She underwent a second injection and is in for follow-up. She says since the second injection, she has not had any attacks. Occasionally gets a little lightheaded with fatigue. She feels her hearing has fluctuated on the right but still remains down. She does have right tinnitus. She denies any dysphagia, hoarseness, facial paresthesias. She has been on Ménière's medications.    MEDICATIONS: Please refer to the detailed medication reconciliation performed by my nurse today, which I have reviewed and signed.     ALLERGIES:    Allergies   Allergen Reactions     Penicillins        HABITS:   Alcohol use No    History   Smoking Status     Never Smoker   Smokeless Tobacco     Never Used         PAST MEDICAL HISTORY:  Please see today's intake form (for the remainder of the PMH) which I reviewed and signed.  Past Medical History:   Diagnosis Date     Arthritis      Benign positional vertigo      Conductive hearing loss      Hearing problem      Meniere's disease       Reduced vision      Sensorineural hearing loss      Uncomplicated asthma        FAMILY HISTORY/SOCIAL HISTORY:    Family History   Problem Relation Age of Onset     CANCER Mother     Social History     Social History     Marital status: Single     Spouse name: N/A     Number of children: N/A     Years of education: N/A     Occupational History     Not on file.     Social History Main Topics     Smoking status: Never Smoker     Smokeless tobacco: Never Used     Alcohol use No     Drug use: No     Sexual activity: No     Other Topics Concern     Not on file     Social History Narrative       REVIEW OF SYSTEMS: Please see today's intake form (for the remainder of the ROS) which I have reviewed and signed.    PHYSICIAL EXAMINATION:  Constitutional: The patient was well-groomed and in no acute distress.   Skin: Warm and pink.  Psychiatric: The patient's affect was calm, cooperative, and appropriate.   Respiratory: Breathing comfortably without stridor or exertion of accessory muscles.  Eyes: Pupils were equal and reactive. Extraocular movement intact.   Head: Normocephalic and atraumatic. No lesions or scars.  Ears: On exam both ears examined with the microscope. Under high-power medication the right side does have some cerumen next to the drum. This was removed with small hook and alligator. Following removal, the old injection site shows a small persistent perforation, looks to be healing. Middle ears normal. Opposite ear is cleaned and examined using [place], curet, and similar techniques. Kelly middle ear looked normal.  Nose: Sinuses were nontender. Anterior rhinoscopy revealed midline septum and absence of purulence or polyps.  Oral Cavity: Normal tongue, floor of moth, buccal mucosa, and palate. No abnormal lymph tissue in the oropharynx.   Neck: The parotid is soft without masses. Supple with normal laryngeal and tracheal landmarks.   Lymphatic: There is no palpable lymphadenopathy or other masses in the neck.    Neurologic: Alert and oriented x 3. Cranial nerves III-XI within normal limits. Voice quality normal.  Cerebellar Function Tests:  Grossly normal    Audiogram:  Audiogram shows normal hearing in the left ear. The right ear shows a severe sensorineural hearing loss with discrimination at 24%.    IMPRESSION AND PLAN: Deo for some time and symptoms have improved considerably as far as the dizziness. She still has pretty dramatic hearing loss, still has subjective evidence of fluctuation. Would recommend we go ahead with third injection today. Risk of this discussed with perforation, drainage, continued problems, etc. She desires to proceed an injection completed without incident. She will follow-up in 3 weeks.    Procedure note:  Patient placed supine on the microscope. Under high-power magnification, dexamethasone was injected through the small residual perforation using a 25-gauge needle. Dexamethasone and a solution of 24 mg/cc with a total injection amount of 0.8 mL. She remained supine for 20 minutes and was released to her own care.    Thank you very much for the opportunity to participate in the care of your patient.    Rick L Nissen MD

## 2017-09-26 NOTE — PROGRESS NOTES
AUDIOLOGY REPORT    SUMMARY: Audiology visit completed. See audiogram for results.      RECOMMENDATIONS: Follow-up with ENT.      Yamileth Hussein, CCC-A  Licensed Audiologist  [place] #[id number]

## 2017-09-26 NOTE — PROGRESS NOTES
Dear Dr. Fung primary care provider on file.:    I had the pleasure of seeing Karolina Winn in followup today at the [facility].    HISTORY OF PRESENT ILLNESS: Patient is a 72-year-old in today for follow-up on suspected right Ménière's. I initially saw her 6/6/17. She was having daily episodes of vertigo with right hearing loss, suspected right Ménière's. She did have an MRI scan per Dr. De's note, his review said it was normal. I don't actually have that MRI. Any when she underwent an injection at that first visit, on her follow-up last visit, 7/11/17, she had improved and was only having 2 episodes since the first injection. Hearing was still down. She underwent a second injection and is in for follow-up. She says since the second injection, she has not had any attacks. Occasionally gets a little lightheaded with fatigue. She feels her hearing has fluctuated on the right but still remains down. She does have right tinnitus. She denies any dysphagia, hoarseness, facial paresthesias. She has been on Ménière's medications.    MEDICATIONS: Please refer to the detailed medication reconciliation performed by my nurse today, which I have reviewed and signed.     ALLERGIES:    Allergies   Allergen Reactions     Penicillins        HABITS:   Alcohol use No    History   Smoking Status     Never Smoker   Smokeless Tobacco     Never Used         PAST MEDICAL HISTORY:  Please see today's intake form (for the remainder of the PMH) which I reviewed and signed.  Past Medical History:   Diagnosis Date     Arthritis      Benign positional vertigo      Conductive hearing loss      Hearing problem      Meniere's disease      Reduced vision      Sensorineural hearing loss      Uncomplicated asthma        FAMILY HISTORY/SOCIAL HISTORY:    Family History   Problem Relation Age of Onset     CANCER Mother     Social History     Social History     Marital status: Single     Spouse name: N/A     Number of  children: N/A     Years of education: N/A     Occupational History     Not on file.     Social History Main Topics     Smoking status: Never Smoker     Smokeless tobacco: Never Used     Alcohol use No     Drug use: No     Sexual activity: No     Other Topics Concern     Not on file     Social History Narrative       REVIEW OF SYSTEMS: Please see today's intake form (for the remainder of the ROS) which I have reviewed and signed.    PHYSICIAL EXAMINATION:  Constitutional: The patient was well-groomed and in no acute distress.   Skin: Warm and pink.  Psychiatric: The patient's affect was calm, cooperative, and appropriate.   Respiratory: Breathing comfortably without stridor or exertion of accessory muscles.  Eyes: Pupils were equal and reactive. Extraocular movement intact.   Head: Normocephalic and atraumatic. No lesions or scars.  Ears: On exam both ears examined with the microscope. Under high-power medication the right side does have some cerumen next to the drum. This was removed with small hook and alligator. Following removal, the old injection site shows a small persistent perforation, looks to be healing. Middle ears normal. Opposite ear is cleaned and examined using [place], curet, and similar techniques. Kelly middle ear looked normal.  Nose: Sinuses were nontender. Anterior rhinoscopy revealed midline septum and absence of purulence or polyps.  Oral Cavity: Normal tongue, floor of moth, buccal mucosa, and palate. No abnormal lymph tissue in the oropharynx.   Neck: The parotid is soft without masses. Supple with normal laryngeal and tracheal landmarks.   Lymphatic: There is no palpable lymphadenopathy or other masses in the neck.   Neurologic: Alert and oriented x 3. Cranial nerves III-XI within normal limits. Voice quality normal.  Cerebellar Function Tests:  Grossly normal    Audiogram:  Audiogram shows normal hearing in the left ear. The right ear shows a severe sensorineural hearing loss with  discrimination at 24%.    IMPRESSION AND PLAN: Deo for some time and symptoms have improved considerably as far as the dizziness. She still has pretty dramatic hearing loss, still has subjective evidence of fluctuation. Would recommend we go ahead with third injection today. Risk of this discussed with perforation, drainage, continued problems, etc. She desires to proceed an injection completed without incident. She will follow-up in 3 weeks.    Procedure note:  Patient placed supine on the microscope. Under high-power magnification, dexamethasone was injected through the small residual perforation using a 25-gauge needle. Dexamethasone and a solution of 24 mg/cc with a total injection amount of 0.8 mL. She remained supine for 20 minutes and was released to her own care.    Thank you very much for the opportunity to participate in the care of your patient.    Rick L Nissen MD

## 2019-03-11 ENCOUNTER — DOCUMENTATION ONLY (OUTPATIENT)
Dept: CARE COORDINATION | Facility: CLINIC | Age: 74
End: 2019-03-11

## 2019-04-15 DIAGNOSIS — H91.90 HEARING LOSS, UNSPECIFIED HEARING LOSS TYPE, UNSPECIFIED LATERALITY: Primary | ICD-10-CM

## 2019-04-16 ENCOUNTER — OFFICE VISIT (OUTPATIENT)
Dept: OTOLARYNGOLOGY | Facility: CLINIC | Age: 74
End: 2019-04-16
Payer: MEDICARE

## 2019-04-16 ENCOUNTER — OFFICE VISIT (OUTPATIENT)
Dept: AUDIOLOGY | Facility: CLINIC | Age: 74
End: 2019-04-16
Attending: OTOLARYNGOLOGY
Payer: MEDICARE

## 2019-04-16 VITALS
BODY MASS INDEX: 28.91 KG/M2 | HEIGHT: 57 IN | DIASTOLIC BLOOD PRESSURE: 70 MMHG | SYSTOLIC BLOOD PRESSURE: 136 MMHG | HEART RATE: 68 BPM | WEIGHT: 134 LBS | TEMPERATURE: 97.8 F | RESPIRATION RATE: 21 BRPM

## 2019-04-16 DIAGNOSIS — H93.11 TINNITUS, RIGHT: ICD-10-CM

## 2019-04-16 DIAGNOSIS — H81.01 MENIERE'S DISEASE, RIGHT: Primary | ICD-10-CM

## 2019-04-16 DIAGNOSIS — H90.3 SENSORY HEARING LOSS, BILATERAL: Primary | ICD-10-CM

## 2019-04-16 DIAGNOSIS — R42 DIZZINESS: ICD-10-CM

## 2019-04-16 DIAGNOSIS — H91.90 HEARING LOSS, UNSPECIFIED HEARING LOSS TYPE, UNSPECIFIED LATERALITY: ICD-10-CM

## 2019-04-16 RX ORDER — TRIAMTERENE AND HYDROCHLOROTHIAZIDE 37.5; 25 MG/1; MG/1
1 CAPSULE ORAL EVERY MORNING
Qty: 90 CAPSULE | Refills: 3 | Status: SHIPPED | OUTPATIENT
Start: 2019-04-16 | End: 2020-06-22

## 2019-04-16 RX ORDER — TRIAMTERENE AND HYDROCHLOROTHIAZIDE 37.5; 25 MG/1; MG/1
1 CAPSULE ORAL EVERY MORNING
Qty: 30 CAPSULE | Refills: 0 | Status: SHIPPED | OUTPATIENT
Start: 2019-04-16 | End: 2019-05-16

## 2019-04-16 ASSESSMENT — PAIN SCALES - GENERAL: PAINLEVEL: NO PAIN (0)

## 2019-04-16 ASSESSMENT — MIFFLIN-ST. JEOR: SCORE: 994.32

## 2019-04-16 NOTE — PATIENT INSTRUCTIONS
1.  You were seen in the ENT Clinic today by Dr. Nissen.  If you have any questions or concerns after your appointment, please call [phone]. Press option #1 for scheduling related needs. Press option #3 for Nurse advice.    2.  Plan is to return to clinic 1 month with an Audiogram and Tympanogram (hearing test) prior to appointment.      Chelsey Armando LPN  [facility] Otolaryngology  [phone]

## 2019-04-16 NOTE — LETTER
4/16/2019       RE: Karolina Winn  [address] [facility] [facility] [place] [address]     Dear Colleague,    Thank you for referring your patient, Karolina Winn, to the [facility] EAR NOSE AND THROAT at [facility]. Please see a copy of my visit note below.    Dear Dr. Fung Ref-Primary, Physician:    I had the pleasure of seeing Karolina Winn in followup today at the [facility].    CHIEF COMPLAINT: Right Ménière's    HISTORY OF PRESENT ILLNESS: Patient is a 74-year-old in today for follow-up from her last visit in September 2017.  She had underwent 3 transtympanic steroid injections before that visit for her right Ménière's.  She was doing better with her dizziness at that time and we were going to monitor.  She has had an MRI scan.  She also had a right sensorineural hearing loss and right tinnitus.  She was on 3 Ménière's medications but has stopped that.  On her follow-up today, she has had a recent episode of vertigo, she has had 2 other ones in the past several months.  Each episode consisted of spinning vertigo with nausea and vomiting.  The dizziness can last up to a couple hours.  She does have right tinnitus that increases with the episode and her right hearing loss that she feels does have some fluctuation.  She had no pain or drainage in the ears.  She denies any dysphagia, hoarseness, facial paresthesias.  She again has stopped her Ménière's medications.    MEDICATIONS: Please refer to the detailed medication reconciliation performed by my nurse today, which I have reviewed and signed.     ALLERGIES:    Allergies   Allergen Reactions     Penicillins        HABITS: Social History    Substance and Sexual Activity      Alcohol use: No     History   Smoking Status     Never Smoker   Smokeless Tobacco     Never Used         PAST MEDICAL HISTORY:  Please see today's intake form (for the remainder of the PMH) which I reviewed and signed.  Past Medical History:    Diagnosis Date     Arthritis      Benign positional vertigo      Conductive hearing loss      Hearing problem      Meniere's disease      Reduced vision      Sensorineural hearing loss      Uncomplicated asthma        FAMILY HISTORY/SOCIAL HISTORY:    Family History   Problem Relation Age of Onset     Cancer Mother       Social History     Socioeconomic History     Marital status: Single     Spouse name: Not on file     Number of children: Not on file     Years of education: Not on file     Highest education level: Not on file   Occupational History     Not on file   Social Needs     Financial resource strain: Not on file     Food insecurity:     Worry: Not on file     Inability: Not on file     Transportation needs:     Medical: Not on file     Non-medical: Not on file   Tobacco Use     Smoking status: Never Smoker     Smokeless tobacco: Never Used   Substance and Sexual Activity     Alcohol use: No     Drug use: No     Sexual activity: Never   Lifestyle     Physical activity:     Days per week: Not on file     Minutes per session: Not on file     Stress: Not on file   Relationships     Social connections:     Talks on phone: Not on file     Gets together: Not on file     Attends Holiness service: Not on file     Active member of club or organization: Not on file     Attends meetings of clubs or organizations: Not on file     Relationship status: Not on file     Intimate partner violence:     Fear of current or ex partner: Not on file     Emotionally abused: Not on file     Physically abused: Not on file     Forced sexual activity: Not on file   Other Topics Concern     Not on file   Social History Narrative     Not on file       REVIEW OF SYSTEMS: Patient Supplied Answers to Review of Systems   ENT ROS 4/16/2019   Constitutional Problems with sleep   Neurology Dizzy spells   Ears, Nose, Throat -   Gastrointestinal/Genitourinary -   Musculoskeletal -       The remainder of the 10 point ROS is  negative    PHYSICIAL EXAMINATION:  Constitutional: The patient was well-groomed and in no acute distress.   Skin: Warm and pink.  Psychiatric: The patient's affect was calm, cooperative, and appropriate.   Respiratory: Breathing comfortably without stridor or exertion of accessory muscles.  Eyes: Pupils were equal and reactive. Extraocular movement intact.   Head: Normocephalic and atraumatic. No lesions or scars.  Ears: Patient placed under the microscope for microscopic evaluation and cleaning of cerumen which was obscuring full visualization of both TMs. Under high power magnification, the right ear was examined and cleaned of cerumen using curet, alligator forceps, and suction.  After cleaning, TM is fully visualized and has normal position with normal middle ear aeration. The left ear was then cleaned and inspected using microscope, instruments and similar techniques. After cleaning of cerumen, the TM has normal position with normal aeration to middle ear.  Nose: Sinuses were nontender. Anterior rhinoscopy revealed midline septum and absence of purulence or polyps.  Oral Cavity: Normal tongue, floor of mouth, buccal mucosa, and palate. No abnormal lymph tissue in the oropharynx.   Neck: The parotid is soft without masses. Supple with normal laryngeal and tracheal landmarks.   Lymphatic: There is no palpable lymphadenopathy or other masses in the neck.   Neurologic: Alert and oriented x 3. Cranial nerves III-XI within normal limits. Voice quality normal.  Cerebellar Function Tests:  Grossly normal    Audiogram: Audiogram performed shows normal hearing in the left ear.  Right ear shows moderate severe sensorineural hearing loss with some fluctuation noted over her last 3 audiograms.  Today her discrimination is at 32%, it has been at 16% and 24% on her other audiograms.  Left ear maintains good discrimination at 92%.    IMPRESSION AND PLAN:   1. Right Ménière's: Some recent activity with vertigo.  She is leaving  [place] and would like to get back on medication and treatment.  We discussed again 2 Ménière's medications as well as a transtympanic steroid injection.  Risk of this discussed.  She desires to proceed and injection completed without incident.  She will go back on her Dyazide once a day and Benadryl at night.  She has not been sleeping well and she feels this affects her episodes, hopefully the Benadryl will also help her sleep better.  She will follow-up in 3 weeks to monitor.  2. Dizziness: Secondary to her Ménière's, treatment as above, monitor  3. Right sensorineural hearing loss: Secondary to Ménière's, treatment as above, monitor  4. Right tinnitus: Secondary to Ménière's, treatment as above, monitor.    PROCEDURE NOTE:  Pt placed supine under the microscope.. The right ear was examined. A drop of phenol was placed in the posterior superior quadrant. Dexamethasone in a solution of 24 mg/ml was injected into the right middle ear using a 25 gauge needle. Total injection of 1 ml.  Pt remained supine for 20 minutes and was released to their own care.    Patient will follow-up in 3 weeks to monitor and possible further injection if warranted.        Thank you very much for the opportunity to participate in the care of your patient.    Rick L Nissen MD            Again, thank you for allowing me to participate in the care of your patient.      Sincerely,    Rick L. Nissen, MD

## 2019-04-16 NOTE — LETTER
Date:April 17, 2019      Patient was self referred, no letter generated. Do not send.        [facility] Health Information

## 2019-04-16 NOTE — NURSING NOTE
"Chief Complaint   Patient presents with     RECHECK     right meniere;'s disease     Blood pressure 136/70, pulse 68, temperature 97.8  F (36.6  C), resp. rate 21, height 1.46 m (4' 9.48\"), weight 60.8 kg (134 lb).    Yuriy Dai LPN    "

## 2019-04-16 NOTE — PROGRESS NOTES
AUDIOLOGY REPORT    SUMMARY: Audiology visit completed. See audiogram for results.      RECOMMENDATIONS: Follow-up with ENT.      Yamileth Rasheed, CCC-A  Licensed Audiologist  [place] #[id number]

## 2019-04-17 NOTE — PROGRESS NOTES
Dear Dr. Fung Ref-Primary, Physician:    I had the pleasure of seeing Karolina Winn in followup today at the [facility].    CHIEF COMPLAINT: Right Ménière's    HISTORY OF PRESENT ILLNESS: Patient is a 74-year-old in today for follow-up from her last visit in September 2017.  She had underwent 3 transtympanic steroid injections before that visit for her right Ménière's.  She was doing better with her dizziness at that time and we were going to monitor.  She has had an MRI scan.  She also had a right sensorineural hearing loss and right tinnitus.  She was on 3 Ménière's medications but has stopped that.  On her follow-up today, she has had a recent episode of vertigo, she has had 2 other ones in the past several months.  Each episode consisted of spinning vertigo with nausea and vomiting.  The dizziness can last up to a couple hours.  She does have right tinnitus that increases with the episode and her right hearing loss that she feels does have some fluctuation.  She had no pain or drainage in the ears.  She denies any dysphagia, hoarseness, facial paresthesias.  She again has stopped her Ménière's medications.    MEDICATIONS: Please refer to the detailed medication reconciliation performed by my nurse today, which I have reviewed and signed.     ALLERGIES:    Allergies   Allergen Reactions     Penicillins        HABITS: Social History    Substance and Sexual Activity      Alcohol use: No     History   Smoking Status     Never Smoker   Smokeless Tobacco     Never Used         PAST MEDICAL HISTORY:  Please see today's intake form (for the remainder of the PMH) which I reviewed and signed.  Past Medical History:   Diagnosis Date     Arthritis      Benign positional vertigo      Conductive hearing loss      Hearing problem      Meniere's disease      Reduced vision      Sensorineural hearing loss      Uncomplicated asthma        FAMILY HISTORY/SOCIAL HISTORY:    Family History   Problem  Relation Age of Onset     Cancer Mother       Social History     Socioeconomic History     Marital status: Single     Spouse name: Not on file     Number of children: Not on file     Years of education: Not on file     Highest education level: Not on file   Occupational History     Not on file   Social Needs     Financial resource strain: Not on file     Food insecurity:     Worry: Not on file     Inability: Not on file     Transportation needs:     Medical: Not on file     Non-medical: Not on file   Tobacco Use     Smoking status: Never Smoker     Smokeless tobacco: Never Used   Substance and Sexual Activity     Alcohol use: No     Drug use: No     Sexual activity: Never   Lifestyle     Physical activity:     Days per week: Not on file     Minutes per session: Not on file     Stress: Not on file   Relationships     Social connections:     Talks on phone: Not on file     Gets together: Not on file     Attends Jew service: Not on file     Active member of club or organization: Not on file     Attends meetings of clubs or organizations: Not on file     Relationship status: Not on file     Intimate partner violence:     Fear of current or ex partner: Not on file     Emotionally abused: Not on file     Physically abused: Not on file     Forced sexual activity: Not on file   Other Topics Concern     Not on file   Social History Narrative     Not on file       REVIEW OF SYSTEMS: Patient Supplied Answers to Review of Systems   ENT ROS 4/16/2019   Constitutional Problems with sleep   Neurology Dizzy spells   Ears, Nose, Throat -   Gastrointestinal/Genitourinary -   Musculoskeletal -       The remainder of the 10 point ROS is negative    PHYSICIAL EXAMINATION:  Constitutional: The patient was well-groomed and in no acute distress.   Skin: Warm and pink.  Psychiatric: The patient's affect was calm, cooperative, and appropriate.   Respiratory: Breathing comfortably without stridor or exertion of accessory  muscles.  Eyes: Pupils were equal and reactive. Extraocular movement intact.   Head: Normocephalic and atraumatic. No lesions or scars.  Ears: Patient placed under the microscope for microscopic evaluation and cleaning of cerumen which was obscuring full visualization of both TMs. Under high power magnification, the right ear was examined and cleaned of cerumen using curet, alligator forceps, and suction.  After cleaning, TM is fully visualized and has normal position with normal middle ear aeration. The left ear was then cleaned and inspected using microscope, instruments and similar techniques. After cleaning of cerumen, the TM has normal position with normal aeration to middle ear.  Nose: Sinuses were nontender. Anterior rhinoscopy revealed midline septum and absence of purulence or polyps.  Oral Cavity: Normal tongue, floor of mouth, buccal mucosa, and palate. No abnormal lymph tissue in the oropharynx.   Neck: The parotid is soft without masses. Supple with normal laryngeal and tracheal landmarks.   Lymphatic: There is no palpable lymphadenopathy or other masses in the neck.   Neurologic: Alert and oriented x 3. Cranial nerves III-XI within normal limits. Voice quality normal.  Cerebellar Function Tests:  Grossly normal    Audiogram: Audiogram performed shows normal hearing in the left ear.  Right ear shows moderate severe sensorineural hearing loss with some fluctuation noted over her last 3 audiograms.  Today her discrimination is at 32%, it has been at 16% and 24% on her other audiograms.  Left ear maintains good discrimination at 92%.    IMPRESSION AND PLAN:   1. Right Ménière's: Some recent activity with vertigo.  She is leaving for [place] home [place] in [place] and would like to get back on medication and treatment.  We discussed again 2 Ménière's medications as well as a transtympanic steroid injection.  Risk of this discussed.  She desires to proceed and injection completed without incident.  She will go  back on her Dyazide once a day and Benadryl at night.  She has not been sleeping well and she feels this affects her episodes, hopefully the Benadryl will also help her sleep better.  She will follow-up in 3 weeks to monitor.  2. Dizziness: Secondary to her Ménière's, treatment as above, monitor  3. Right sensorineural hearing loss: Secondary to Ménière's, treatment as above, monitor  4. Right tinnitus: Secondary to Ménière's, treatment as above, monitor.    PROCEDURE NOTE:  Pt placed supine under the microscope.. The right ear was examined. A drop of phenol was placed in the posterior superior quadrant. Dexamethasone in a solution of 24 mg/ml was injected into the right middle ear using a 25 gauge needle. Total injection of 1 ml.  Pt remained supine for 20 minutes and was released to their own care.    Patient will follow-up in 3 weeks to monitor and possible further injection if warranted.        Thank you very much for the opportunity to participate in the care of your patient.    Rick L Nissen MD

## 2019-05-02 ENCOUNTER — TELEPHONE (OUTPATIENT)
Dept: OTOLARYNGOLOGY | Facility: CLINIC | Age: 74
End: 2019-05-02

## 2019-05-02 NOTE — TELEPHONE ENCOUNTER
Spoke to pt regarding medication concern and clarification.pt states she received banophen instead of benadryl at our pharmacy and that was not helping her sleep, it instead was keeping her awake.advised pt that benophen may be a generic or other brand name for benadryl, which upon research was the case.she was advised she still had a prescription for benadryl available at her local pharmacy in [place] that she could fill.suggested she contact the pharmacy and make sure that they give her benadryl and not something else. Pt said she would try that. Advised pt to call back if there were any additional concerns.

## 2019-05-02 NOTE — TELEPHONE ENCOUNTER
[facility] Call Center    Phone Message    May a detailed message be left on voicemail: yes    Reason for Call: Other: pt says she needs a different RX to help her sleep, the one she was given is keeping her up at night and tingling. pt stopped taking this RX. Please call [facility] in [place] [address] [address] Phone: [phone]      Action Taken: Message routed to:  [facility] ([facility]): ent

## 2019-06-17 DIAGNOSIS — H91.90 HEARING LOSS, UNSPECIFIED HEARING LOSS TYPE, UNSPECIFIED LATERALITY: Primary | ICD-10-CM

## 2019-06-18 ENCOUNTER — OFFICE VISIT (OUTPATIENT)
Dept: AUDIOLOGY | Facility: CLINIC | Age: 74
End: 2019-06-18
Payer: MEDICARE

## 2019-06-18 ENCOUNTER — OFFICE VISIT (OUTPATIENT)
Dept: OTOLARYNGOLOGY | Facility: CLINIC | Age: 74
End: 2019-06-18
Payer: MEDICARE

## 2019-06-18 VITALS
WEIGHT: 138 LBS | HEART RATE: 75 BPM | RESPIRATION RATE: 16 BRPM | BODY MASS INDEX: 28.97 KG/M2 | TEMPERATURE: 98 F | HEIGHT: 58 IN | DIASTOLIC BLOOD PRESSURE: 75 MMHG | SYSTOLIC BLOOD PRESSURE: 128 MMHG

## 2019-06-18 DIAGNOSIS — R42 DIZZINESS: ICD-10-CM

## 2019-06-18 DIAGNOSIS — H90.3 SENSORINEURAL HEARING LOSS, BILATERAL: Primary | ICD-10-CM

## 2019-06-18 DIAGNOSIS — H93.11 TINNITUS, RIGHT: ICD-10-CM

## 2019-06-18 DIAGNOSIS — H81.01 MENIERE'S DISEASE, RIGHT: Primary | ICD-10-CM

## 2019-06-18 DIAGNOSIS — H91.90 HEARING LOSS, UNSPECIFIED HEARING LOSS TYPE, UNSPECIFIED LATERALITY: ICD-10-CM

## 2019-06-18 ASSESSMENT — PAIN SCALES - GENERAL: PAINLEVEL: NO PAIN (0)

## 2019-06-18 ASSESSMENT — MIFFLIN-ST. JEOR: SCORE: 1024.96

## 2019-06-18 NOTE — LETTER
6/18/2019       RE: Karolina Winn  [address] [facility] [place] [address]     Dear Colleague,    Thank you for referring your patient, Karolina Winn, to the [facility] EAR NOSE AND THROAT at [facility]. Please see a copy of my visit note below.    Dear Dr. Fung Ref-Primary, Physician:    I had the pleasure of seeing Karolina Winn in followup today at the [facility].    CHIEF COMPLAINT: Right Ménière's    HISTORY OF PRESENT ILLNESS: Patient is a 74-year-old in today for follow-up from her last visit 3 weeks ago.  She is been diagnosed with right Ménière's for some time.  She had underwent 3 injections in 2017 and has been on medications, was doing great.  She stopped the medication 6 months ago.  She is had 2 recent episodes of vertigo associated with increased right tinnitus and increased right hearing loss.  She was seen at her last visit and restarted on medications and underwent her initial transtympanic steroid injection.  Her concern is she is leaving for [facility] in July for 8 months.  On her follow-up today, she has had no severe attacks of vertigo.  Has a few episodes of mild imbalance.  She does have right tinnitus that is unchanged and stable.  She feels the right hearing seems slightly better, clear today.  Again she is had no severe episodes of vertigo.  She denies any dysphasia, hoarseness, facial paresthesias.  She has had an MRI scan which was negative.    MEDICATIONS: Please refer to the detailed medication reconciliation performed by my nurse today, which I have reviewed and signed.     ALLERGIES:    Allergies   Allergen Reactions     Penicillins      Seasonal Allergies        HABITS: Social History    Substance and Sexual Activity      Alcohol use: No     History   Smoking Status     Never Smoker   Smokeless Tobacco     Never Used         PAST MEDICAL HISTORY:  Please see today's intake form (for the remainder of the PMH) which I reviewed and  signed.  Past Medical History:   Diagnosis Date     Arthritis      Benign positional vertigo      Conductive hearing loss      Hearing problem      Meniere's disease      Reduced vision      Sensorineural hearing loss      Uncomplicated asthma        FAMILY HISTORY/SOCIAL HISTORY:    Family History   Problem Relation Age of Onset     Cancer Mother       Social History     Socioeconomic History     Marital status: Single     Spouse name: Not on file     Number of children: Not on file     Years of education: Not on file     Highest education level: Not on file   Occupational History     Not on file   Social Needs     Financial resource strain: Not on file     Food insecurity:     Worry: Not on file     Inability: Not on file     Transportation needs:     Medical: Not on file     Non-medical: Not on file   Tobacco Use     Smoking status: Never Smoker     Smokeless tobacco: Never Used   Substance and Sexual Activity     Alcohol use: No     Drug use: No     Sexual activity: Never   Lifestyle     Physical activity:     Days per week: Not on file     Minutes per session: Not on file     Stress: Not on file   Relationships     Social connections:     Talks on phone: Not on file     Gets together: Not on file     Attends Yazidism service: Not on file     Active member of club or organization: Not on file     Attends meetings of clubs or organizations: Not on file     Relationship status: Not on file     Intimate partner violence:     Fear of current or ex partner: Not on file     Emotionally abused: Not on file     Physically abused: Not on file     Forced sexual activity: Not on file   Other Topics Concern     Not on file   Social History Narrative     Not on file       REVIEW OF SYSTEMS: Patient Supplied Answers to Review of Systems  [facility] ENT ROS 6/18/2019   Constitutional Problems with sleep   Neurology Dizzy spells   Ears, Nose, Throat -   Gastrointestinal/Genitourinary -   Musculoskeletal Back pain       The remainder  of the 10 point ROS is negative    PHYSICIAL EXAMINATION:  Constitutional: The patient was well-groomed and in no acute distress.   Skin: Warm and pink.  Psychiatric: The patient's affect was calm, cooperative, and appropriate.   Respiratory: Breathing comfortably without stridor or exertion of accessory muscles.  Eyes: Pupils were equal and reactive. Extraocular movement intact.   Head: Normocephalic and atraumatic. No lesions or scars.  Ears: Patient placed under the microscope for microscopic evaluation and cleaning of cerumen which was obscuring full visualization of both TMs. Under high power magnification, the right ear was examined and cleaned of cerumen using curet, alligator forceps, and suction.  After cleaning, TM is fully visualized and has normal position with normal middle ear aeration. The left ear was then cleaned and inspected using microscope, instruments and similar techniques. After cleaning of cerumen, the TM has normal position with normal aeration to middle ear.  Nose: Sinuses were nontender. Anterior rhinoscopy revealed midline septum and absence of purulence or polyps.  Oral Cavity: Normal tongue, floor of mouth, buccal mucosa, and palate. No abnormal lymph tissue in the oropharynx.   Neck: The parotid is soft without masses. Supple with normal laryngeal and tracheal landmarks.   Lymphatic: There is no palpable lymphadenopathy or other masses in the neck.   Neurologic: Alert and oriented x 3. Cranial nerves III-XI within normal limits. Voice quality normal.  Cerebellar Function Tests:  Grossly normal    Audiogram: Audiogram performed shows a continued right asymmetrical sensorineural hearing loss that looks fairly stable from before.  Left side shows low normal hearing.  Fairly stable from before.    IMPRESSION AND PLAN:   1. Right Ménière's: Fairly stable by history today.  She has had much improvement in her dizziness, still with auditory symptoms but stable.  With her leaving for  [facility], discussed one more injection today and certainly continue with medications.  Multiple questions answered.  She desires to proceed as such and ejection completed without incident.  She will continue with medications and refills provided.  She will follow-up when she returns from [facility] in 8 months.  She lives in [place] but prefers to follow along here as she has family in town.  2. Right asymmetrical sensorineural hearing loss: Stable, no treatment needed, monitor.  3. Right tinnitus: Secondary to right Ménière's, stable, monitor.    PROCEDURE NOTE:  Pt placed supine under the microscope.. The right ear was examined. A drop of phenol was placed in the posterior superior quadrant. Dexamethasone in a solution of 24 mg/ml was injected into the right middle ear using a 25 gauge needle. Total injection of 1 ml.  Pt remained supine for 20 minutes and was released to their own care.      Thank you very much for the opportunity to participate in the care of your patient.    Rick L Nissen MD            Again, thank you for allowing me to participate in the care of your patient.      Sincerely,    Rick L. Nissen, MD

## 2019-06-18 NOTE — LETTER
Date:June 20, 2019      Patient was self referred, no letter generated. Do not send.        [facility] Health Information

## 2019-06-18 NOTE — PATIENT INSTRUCTIONS
1.  You were seen in the ENT Clinic today by Dr. Nissen.  If you have any questions or concerns after your appointment, please call [phone]. Press option #1 for scheduling related needs. Press option #3 for Nurse advice.    2.  Plan is to return to clinic when you return from trip.      Chelsey Armando LPN  [facility] Ot[facility]  [phone]

## 2019-06-18 NOTE — PROGRESS NOTES
AUDIOLOGY REPORT    SUMMARY: Audiology visit completed. See audiogram for results.      RECOMMENDATIONS: Follow-up with ENT.    Marshall Wing, [facility]  Licensed Audiologist  [place] License #[id number]

## 2019-06-18 NOTE — NURSING NOTE
"Chief Complaint   Patient presents with     RECHECK     Right Meniere's   Blood pressure 128/75, pulse 75, temperature 98  F (36.7  C), resp. rate 16, height 1.48 m (4' 10.27\"), weight 62.6 kg (138 lb).  Nancy STARK  "

## 2019-06-19 NOTE — PROGRESS NOTES
Dear Dr. Fung Ref-Primary, Physician:    I had the pleasure of seeing Karolina Winn in followup today at the [facility].    CHIEF COMPLAINT: Right Ménière's    HISTORY OF PRESENT ILLNESS: Patient is a 74-year-old in today for follow-up from her last visit 3 weeks ago.  She is been diagnosed with right Ménière's for some time.  She had underwent 3 injections in 2017 and has been on medications, was doing great.  She stopped the medication 6 months ago.  She is had 2 recent episodes of vertigo associated with increased right tinnitus and increased right hearing loss.  She was seen at her last visit and restarted on medications and underwent her initial transtympanic steroid injection.  Her concern is she is leaving for [facility] in July for 8 months.  On her follow-up today, she has had no severe attacks of vertigo.  Has a few episodes of mild imbalance.  She does have right tinnitus that is unchanged and stable.  She feels the right hearing seems slightly better, clear today.  Again she is had no severe episodes of vertigo.  She denies any dysphasia, hoarseness, facial paresthesias.  She has had an MRI scan which was negative.    MEDICATIONS: Please refer to the detailed medication reconciliation performed by my nurse today, which I have reviewed and signed.     ALLERGIES:    Allergies   Allergen Reactions     Penicillins      Seasonal Allergies        HABITS: Social History    Substance and Sexual Activity      Alcohol use: No     History   Smoking Status     Never Smoker   Smokeless Tobacco     Never Used         PAST MEDICAL HISTORY:  Please see today's intake form (for the remainder of the PMH) which I reviewed and signed.  Past Medical History:   Diagnosis Date     Arthritis      Benign positional vertigo      Conductive hearing loss      Hearing problem      Meniere's disease      Reduced vision      Sensorineural hearing loss      Uncomplicated asthma        FAMILY HISTORY/SOCIAL  HISTORY:    Family History   Problem Relation Age of Onset     Cancer Mother       Social History     Socioeconomic History     Marital status: Single     Spouse name: Not on file     Number of children: Not on file     Years of education: Not on file     Highest education level: Not on file   Occupational History     Not on file   Social Needs     Financial resource strain: Not on file     Food insecurity:     Worry: Not on file     Inability: Not on file     Transportation needs:     Medical: Not on file     Non-medical: Not on file   Tobacco Use     Smoking status: Never Smoker     Smokeless tobacco: Never Used   Substance and Sexual Activity     Alcohol use: No     Drug use: No     Sexual activity: Never   Lifestyle     Physical activity:     Days per week: Not on file     Minutes per session: Not on file     Stress: Not on file   Relationships     Social connections:     Talks on phone: Not on file     Gets together: Not on file     Attends Buddhist service: Not on file     Active member of club or organization: Not on file     Attends meetings of clubs or organizations: Not on file     Relationship status: Not on file     Intimate partner violence:     Fear of current or ex partner: Not on file     Emotionally abused: Not on file     Physically abused: Not on file     Forced sexual activity: Not on file   Other Topics Concern     Not on file   Social History Narrative     Not on file       REVIEW OF SYSTEMS: Patient Supplied Answers to Review of Systems   ENT ROS 6/18/2019   Constitutional Problems with sleep   Neurology Dizzy spells   Ears, Nose, Throat -   Gastrointestinal/Genitourinary -   Musculoskeletal Back pain       The remainder of the 10 point ROS is negative    PHYSICIAL EXAMINATION:  Constitutional: The patient was well-groomed and in no acute distress.   Skin: Warm and pink.  Psychiatric: The patient's affect was calm, cooperative, and appropriate.   Respiratory: Breathing comfortably without  stridor or exertion of accessory muscles.  Eyes: Pupils were equal and reactive. Extraocular movement intact.   Head: Normocephalic and atraumatic. No lesions or scars.  Ears: Patient placed under the microscope for microscopic evaluation and cleaning of cerumen which was obscuring full visualization of both TMs. Under high power magnification, the right ear was examined and cleaned of cerumen using curet, alligator forceps, and suction.  After cleaning, TM is fully visualized and has normal position with normal middle ear aeration. The left ear was then cleaned and inspected using microscope, instruments and similar techniques. After cleaning of cerumen, the TM has normal position with normal aeration to middle ear.  Nose: Sinuses were nontender. Anterior rhinoscopy revealed midline septum and absence of purulence or polyps.  Oral Cavity: Normal tongue, floor of mouth, buccal mucosa, and palate. No abnormal lymph tissue in the oropharynx.   Neck: The parotid is soft without masses. Supple with normal laryngeal and tracheal landmarks.   Lymphatic: There is no palpable lymphadenopathy or other masses in the neck.   Neurologic: Alert and oriented x 3. Cranial nerves III-XI within normal limits. Voice quality normal.  Cerebellar Function Tests:  Grossly normal    Audiogram: Audiogram performed shows a continued right asymmetrical sensorineural hearing loss that looks fairly stable from before.  Left side shows low normal hearing.  Fairly stable from before.    IMPRESSION AND PLAN:   1. Right Ménière's: Fairly stable by history today.  She has had much improvement in her dizziness, still with auditory symptoms but stable.  With her leaving for [facility], discussed one more injection today and certainly continue with medications.  Multiple questions answered.  She desires to proceed as such and ejection completed without incident.  She will continue with medications and refills provided.  She will follow-up when she  returns from [facility] in 8 months.  She lives in [place] but prefers to follow along here as she has family in town.  2. Right asymmetrical sensorineural hearing loss: Stable, no treatment needed, monitor.  3. Right tinnitus: Secondary to right Ménière's, stable, monitor.    PROCEDURE NOTE:  Pt placed supine under the microscope.. The right ear was examined. A drop of phenol was placed in the posterior superior quadrant. Dexamethasone in a solution of 24 mg/ml was injected into the right middle ear using a 25 gauge needle. Total injection of 1 ml.  Pt remained supine for 20 minutes and was released to their own care.      Thank you very much for the opportunity to participate in the care of your patient.    Rick L Nissen MD

## 2020-06-22 DIAGNOSIS — H81.01 MENIERE'S DISEASE, RIGHT: ICD-10-CM

## 2020-06-22 RX ORDER — TRIAMTERENE AND HYDROCHLOROTHIAZIDE 37.5; 25 MG/1; MG/1
1 CAPSULE ORAL EVERY MORNING
Qty: 90 CAPSULE | Refills: 3 | Status: SHIPPED | OUTPATIENT
Start: 2020-06-22

## 2020-06-22 NOTE — TELEPHONE ENCOUNTER
triamterene-HCTZ (DYAZIDE) 37.5-25 MG capsule      Last Written Prescription Date:  4-16-19  Last Fill Quantity: 90,   # refills: 3  Last Office Visit : 6-18-19  Future Office visit:  None    Sodium, potassium and creat not on record - clinic notified    .Kathleen M Doege RN

## 2020-06-22 NOTE — TELEPHONE ENCOUNTER
[facility] Call Center    Phone Message    May a detailed message be left on voicemail: yes     Reason for Call: Medication Refill Request    Has the patient contacted the pharmacy for the refill? Yes   Name of medication being requested: triamterene-HCTZ (DYAZIDE) 37.5-25 MG capsule  Provider who prescribed the medication: Dr Nissen  Pharmacy: [id number] - [place], [place] - [address] AT [address]   Date medication is needed: as soon as you can please as she is almost out - Also Pt requested a 90 day supply at a time instead of monthly if you can please      Action Taken: Message routed to:  [facility] ENT    Travel Screening: Not Applicable

## 2025-04-23 ENCOUNTER — TELEPHONE (OUTPATIENT)
Dept: OTOLARYNGOLOGY | Facility: CLINIC | Age: 80
End: 2025-04-23

## 2025-04-23 NOTE — TELEPHONE ENCOUNTER
Left Voicemail (1st Attempt) for the patient to call back and schedule the following:    Appointment Type: New Vestibular  Provider: Community clinics (except Dr Sexton)   Appt date: next open  Specialty phone number: [phone]   Additional appointment(s) needed: audio  Additional Notes: patient needs to establish care with a community clinic after they get a referral since they are considered a new patient and will not be seen until a referral is placed

## 2025-04-23 NOTE — TELEPHONE ENCOUNTER
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 Pt was last seen by Nissen. Please call to help schedule she is having a very bad flair. Thank you      Action Taken: Message routed to:  [facility] ENT    Travel Screening: Not Applicable     Date of Service: